# Patient Record
Sex: MALE | Race: WHITE | NOT HISPANIC OR LATINO | Employment: FULL TIME | URBAN - NONMETROPOLITAN AREA
[De-identification: names, ages, dates, MRNs, and addresses within clinical notes are randomized per-mention and may not be internally consistent; named-entity substitution may affect disease eponyms.]

---

## 2021-01-08 ENCOUNTER — HOSPITAL ENCOUNTER (EMERGENCY)
Facility: HOSPITAL | Age: 46
Discharge: HOME | End: 2021-01-08
Attending: EMERGENCY MEDICINE | Admitting: EMERGENCY MEDICINE

## 2021-01-08 ENCOUNTER — APPOINTMENT (OUTPATIENT)
Dept: GENERAL RADIOLOGY | Facility: HOSPITAL | Age: 46
End: 2021-01-08

## 2021-01-08 VITALS
SYSTOLIC BLOOD PRESSURE: 145 MMHG | WEIGHT: 285 LBS | BODY MASS INDEX: 35.43 KG/M2 | HEIGHT: 75 IN | DIASTOLIC BLOOD PRESSURE: 84 MMHG | HEART RATE: 89 BPM | TEMPERATURE: 99.7 F | OXYGEN SATURATION: 94 % | RESPIRATION RATE: 17 BRPM

## 2021-01-08 DIAGNOSIS — U07.1 COVID-19: Primary | ICD-10-CM

## 2021-01-08 LAB
ALBUMIN SERPL-MCNC: 4.1 G/DL (ref 3.5–5.2)
ALBUMIN/GLOB SERPL: 1.2 G/DL
ALP SERPL-CCNC: 450 U/L (ref 39–117)
ALT SERPL W P-5'-P-CCNC: 376 U/L (ref 1–41)
ANION GAP SERPL CALCULATED.3IONS-SCNC: 14.2 MMOL/L (ref 5–15)
ANISOCYTOSIS BLD QL: NORMAL
AST SERPL-CCNC: 206 U/L (ref 1–40)
BASOPHILS # BLD AUTO: 0.02 10*3/MM3 (ref 0–0.2)
BASOPHILS NFR BLD AUTO: 0.3 % (ref 0–1.5)
BILIRUB SERPL-MCNC: 0.5 MG/DL (ref 0–1.2)
BUN SERPL-MCNC: 19 MG/DL (ref 6–20)
BUN/CREAT SERPL: 14.2 (ref 7–25)
CALCIUM SPEC-SCNC: 8.4 MG/DL (ref 8.6–10.5)
CHLORIDE SERPL-SCNC: 100 MMOL/L (ref 98–107)
CO2 SERPL-SCNC: 21.8 MMOL/L (ref 22–29)
CREAT SERPL-MCNC: 1.34 MG/DL (ref 0.76–1.27)
DEPRECATED RDW RBC AUTO: 37.8 FL (ref 37–54)
ELLIPTOCYTES BLD QL SMEAR: NORMAL
EOSINOPHIL # BLD AUTO: 0.02 10*3/MM3 (ref 0–0.4)
EOSINOPHIL NFR BLD AUTO: 0.3 % (ref 0.3–6.2)
ERYTHROCYTE [DISTWIDTH] IN BLOOD BY AUTOMATED COUNT: 14.5 % (ref 12.3–15.4)
GFR SERPL CREATININE-BSD FRML MDRD: 57 ML/MIN/1.73
GLOBULIN UR ELPH-MCNC: 3.4 GM/DL
GLUCOSE SERPL-MCNC: 122 MG/DL (ref 65–99)
HCT VFR BLD AUTO: 43.4 % (ref 37.5–51)
HGB BLD-MCNC: 13.5 G/DL (ref 13–17.7)
IMM GRANULOCYTES # BLD AUTO: 0.04 10*3/MM3 (ref 0–0.05)
IMM GRANULOCYTES NFR BLD AUTO: 0.6 % (ref 0–0.5)
LARGE PLATELETS: NORMAL
LYMPHOCYTES # BLD AUTO: 0.6 10*3/MM3 (ref 0.7–3.1)
LYMPHOCYTES NFR BLD AUTO: 9.6 % (ref 19.6–45.3)
MCH RBC QN AUTO: 22.7 PG (ref 26.6–33)
MCHC RBC AUTO-ENTMCNC: 31.1 G/DL (ref 31.5–35.7)
MCV RBC AUTO: 73.1 FL (ref 79–97)
MICROCYTES BLD QL: NORMAL
MONOCYTES # BLD AUTO: 0.28 10*3/MM3 (ref 0.1–0.9)
MONOCYTES NFR BLD AUTO: 4.5 % (ref 5–12)
NEUTROPHILS NFR BLD AUTO: 5.27 10*3/MM3 (ref 1.7–7)
NEUTROPHILS NFR BLD AUTO: 84.7 % (ref 42.7–76)
NRBC BLD AUTO-RTO: 0 /100 WBC (ref 0–0.2)
PLATELET # BLD AUTO: 214 10*3/MM3 (ref 140–450)
PMV BLD AUTO: 10.3 FL (ref 6–12)
POIKILOCYTOSIS BLD QL SMEAR: NORMAL
POTASSIUM SERPL-SCNC: 4.5 MMOL/L (ref 3.5–5.2)
PROT SERPL-MCNC: 7.5 G/DL (ref 6–8.5)
RBC # BLD AUTO: 5.94 10*6/MM3 (ref 4.14–5.8)
SMALL PLATELETS BLD QL SMEAR: ADEQUATE
SODIUM SERPL-SCNC: 136 MMOL/L (ref 136–145)
WBC # BLD AUTO: 6.23 10*3/MM3 (ref 3.4–10.8)
WBC MORPH BLD: NORMAL

## 2021-01-08 PROCEDURE — 99283 EMERGENCY DEPT VISIT LOW MDM: CPT

## 2021-01-08 PROCEDURE — 85025 COMPLETE CBC W/AUTO DIFF WBC: CPT | Performed by: EMERGENCY MEDICINE

## 2021-01-08 PROCEDURE — 80053 COMPREHEN METABOLIC PANEL: CPT | Performed by: EMERGENCY MEDICINE

## 2021-01-08 PROCEDURE — 85007 BL SMEAR W/DIFF WBC COUNT: CPT | Performed by: EMERGENCY MEDICINE

## 2021-01-08 PROCEDURE — 71045 X-RAY EXAM CHEST 1 VIEW: CPT

## 2021-01-08 RX ORDER — IBUPROFEN 800 MG/1
800 TABLET ORAL ONCE
Status: COMPLETED | OUTPATIENT
Start: 2021-01-08 | End: 2021-01-08

## 2021-01-08 RX ORDER — ACETAMINOPHEN 325 MG/1
975 TABLET ORAL ONCE
Status: COMPLETED | OUTPATIENT
Start: 2021-01-08 | End: 2021-01-08

## 2021-01-08 RX ADMIN — ACETAMINOPHEN 975 MG: 325 TABLET, FILM COATED ORAL at 13:16

## 2021-01-08 RX ADMIN — IBUPROFEN 800 MG: 800 TABLET, FILM COATED ORAL at 12:41

## 2021-01-08 RX ADMIN — SODIUM CHLORIDE 1000 ML: 9 INJECTION, SOLUTION INTRAVENOUS at 12:40

## 2021-01-08 NOTE — ED PROVIDER NOTES
Subjective   45-year-old male presents to the ED with a chief complaint of fever.  Patient states that he tested positive for COVID-19 approximately 10 days ago.  He has been doing well at home but he states that he has been unable to control his fever for the last few days.  He states he has been taking Tylenol intermittently without any improvement in his symptoms.  He complains of feeling flushed and warm.  He denies chest pain or shortness of breath.  Does note that he has a cough but that is not productive of sputum and is not causing him much symptomatology.  He denies lightheadedness or dizziness.  No nausea vomiting diarrhea or abdominal pain.  No other complaints at this time.          Review of Systems   Constitutional: Positive for fever.   Respiratory: Positive for cough. Negative for chest tightness, shortness of breath and wheezing.    All other systems reviewed and are negative.      History reviewed. No pertinent past medical history.    No Known Allergies    Past Surgical History:   Procedure Laterality Date   • KNEE SURGERY         History reviewed. No pertinent family history.    Social History     Socioeconomic History   • Marital status:      Spouse name: Not on file   • Number of children: Not on file   • Years of education: Not on file   • Highest education level: Not on file   Tobacco Use   • Smoking status: Never Smoker   Substance and Sexual Activity   • Alcohol use: Yes     Comment: occassionally   • Drug use: Never   • Sexual activity: Defer           Objective   Physical Exam  Vitals signs and nursing note reviewed.   Constitutional:       General: He is not in acute distress.     Appearance: He is well-developed. He is not diaphoretic.   HENT:      Head: Normocephalic and atraumatic.      Nose: Nose normal.   Eyes:      Conjunctiva/sclera: Conjunctivae normal.      Pupils: Pupils are equal, round, and reactive to light.   Cardiovascular:      Rate and Rhythm: Normal rate and  regular rhythm.   Pulmonary:      Effort: Pulmonary effort is normal. No respiratory distress.      Breath sounds: Normal breath sounds.   Abdominal:      General: There is no distension.      Palpations: Abdomen is soft.      Tenderness: There is no abdominal tenderness.   Musculoskeletal:         General: No deformity.   Neurological:      Mental Status: He is alert and oriented to person, place, and time.      Cranial Nerves: No cranial nerve deficit.      Coordination: Coordination normal.         Procedures           ED Course      PULSE OXIMETRY INTERPRETATION  Patient had a pulse ox of 94% on room air. This is a normal pulse oximetry reading.                                     MDM  45-year-old male known Covid positive presents to the ED for fever. Known Covid. Chest x-ray confirms Covid pneumonia. His pulse ox 94% on room air with normal respiratory effort. Heart rate improved after antipyretics and fluids. He is resting comfortably. No chest pain or shortness of breath. I feel that he is appropriate for discharge with continuation of antipyretics he is agreeable to this plan.      Final diagnoses:   COVID-19            Lambert Quevedo, DO  01/08/21 1775

## 2021-01-11 ENCOUNTER — APPOINTMENT (OUTPATIENT)
Dept: GENERAL RADIOLOGY | Facility: HOSPITAL | Age: 46
End: 2021-01-11

## 2021-01-11 ENCOUNTER — HOSPITAL ENCOUNTER (INPATIENT)
Facility: HOSPITAL | Age: 46
LOS: 3 days | Discharge: HOME | End: 2021-01-14
Attending: EMERGENCY MEDICINE | Admitting: INTERNAL MEDICINE

## 2021-01-11 DIAGNOSIS — E66.9 CLASS 2 OBESITY WITHOUT SERIOUS COMORBIDITY WITH BODY MASS INDEX (BMI) OF 35.0 TO 35.9 IN ADULT, UNSPECIFIED OBESITY TYPE: ICD-10-CM

## 2021-01-11 DIAGNOSIS — J12.82 PNEUMONIA DUE TO COVID-19 VIRUS: Primary | ICD-10-CM

## 2021-01-11 DIAGNOSIS — U07.1 PNEUMONIA DUE TO COVID-19 VIRUS: Primary | ICD-10-CM

## 2021-01-11 DIAGNOSIS — J96.01 ACUTE RESPIRATORY FAILURE WITH HYPOXIA (HCC): ICD-10-CM

## 2021-01-11 DIAGNOSIS — N28.9 MILD RENAL INSUFFICIENCY: ICD-10-CM

## 2021-01-11 DIAGNOSIS — R74.01 TRANSAMINITIS: ICD-10-CM

## 2021-01-11 LAB
A-A DO2: 37.3 MMHG
ALBUMIN SERPL-MCNC: 3.6 G/DL (ref 3.5–5.2)
ALBUMIN/GLOB SERPL: 0.9 G/DL
ALP SERPL-CCNC: 546 U/L (ref 39–117)
ALT SERPL W P-5'-P-CCNC: 352 U/L (ref 1–41)
ANION GAP SERPL CALCULATED.3IONS-SCNC: 10.4 MMOL/L (ref 5–15)
ARTERIAL PATENCY WRIST A: POSITIVE
AST SERPL-CCNC: 182 U/L (ref 1–40)
ATMOSPHERIC PRESS: 742 MMHG
BASE EXCESS BLDA CALC-SCNC: 1.4 MMOL/L (ref 0–2)
BASOPHILS # BLD AUTO: 0.02 10*3/MM3 (ref 0–0.2)
BASOPHILS NFR BLD AUTO: 0.4 % (ref 0–1.5)
BDY SITE: ABNORMAL
BILIRUB SERPL-MCNC: 0.8 MG/DL (ref 0–1.2)
BUN SERPL-MCNC: 14 MG/DL (ref 6–20)
BUN/CREAT SERPL: 9.8 (ref 7–25)
CALCIUM SPEC-SCNC: 8.5 MG/DL (ref 8.6–10.5)
CHLORIDE SERPL-SCNC: 100 MMOL/L (ref 98–107)
CO2 SERPL-SCNC: 24.6 MMOL/L (ref 22–29)
COHGB MFR BLD: 0.8 % (ref 0–2)
CREAT SERPL-MCNC: 1.43 MG/DL (ref 0.76–1.27)
D DIMER PPP FEU-MCNC: 1.23 MCGFEU/ML (ref 0–0.57)
DEPRECATED RDW RBC AUTO: 38.3 FL (ref 37–54)
EOSINOPHIL # BLD AUTO: 0 10*3/MM3 (ref 0–0.4)
EOSINOPHIL NFR BLD AUTO: 0 % (ref 0.3–6.2)
ERYTHROCYTE [DISTWIDTH] IN BLOOD BY AUTOMATED COUNT: 14.6 % (ref 12.3–15.4)
FERRITIN SERPL-MCNC: 3377 NG/ML (ref 30–400)
GAS FLOW AIRWAY: 3 LPM
GFR SERPL CREATININE-BSD FRML MDRD: 53 ML/MIN/1.73
GLOBULIN UR ELPH-MCNC: 4.2 GM/DL
GLUCOSE SERPL-MCNC: 129 MG/DL (ref 65–99)
HCO3 BLDA-SCNC: 25.6 MMOL/L (ref 22–28)
HCT VFR BLD AUTO: 43.5 % (ref 37.5–51)
HCT VFR BLD CALC: 40 %
HGB BLD-MCNC: 13.5 G/DL (ref 13–17.7)
HOLD SPECIMEN: NORMAL
HOLD SPECIMEN: NORMAL
IMM GRANULOCYTES # BLD AUTO: 0.04 10*3/MM3 (ref 0–0.05)
IMM GRANULOCYTES NFR BLD AUTO: 0.7 % (ref 0–0.5)
LDH SERPL-CCNC: 626 U/L (ref 135–225)
LYMPHOCYTES # BLD AUTO: 0.91 10*3/MM3 (ref 0.7–3.1)
LYMPHOCYTES NFR BLD AUTO: 16.3 % (ref 19.6–45.3)
MCH RBC QN AUTO: 22.6 PG (ref 26.6–33)
MCHC RBC AUTO-ENTMCNC: 31 G/DL (ref 31.5–35.7)
MCV RBC AUTO: 72.9 FL (ref 79–97)
METHGB BLD QL: 0.9 % (ref 0–1.5)
MODALITY: ABNORMAL
MONOCYTES # BLD AUTO: 0.11 10*3/MM3 (ref 0.1–0.9)
MONOCYTES NFR BLD AUTO: 2 % (ref 5–12)
NEUTROPHILS NFR BLD AUTO: 4.49 10*3/MM3 (ref 1.7–7)
NEUTROPHILS NFR BLD AUTO: 80.6 % (ref 42.7–76)
NOTE: ABNORMAL
NRBC BLD AUTO-RTO: 0 /100 WBC (ref 0–0.2)
NT-PROBNP SERPL-MCNC: 79.5 PG/ML (ref 0–450)
OXYHGB MFR BLDV: 91.7 % (ref 94–99)
PCO2 BLDA: 38.1 MM HG (ref 35–45)
PCO2 TEMP ADJ BLD: ABNORMAL MM[HG]
PH BLDA: 7.44 PH UNITS (ref 7.3–7.5)
PH, TEMP CORRECTED: ABNORMAL
PLATELET # BLD AUTO: 284 10*3/MM3 (ref 140–450)
PMV BLD AUTO: 9.6 FL (ref 6–12)
PO2 BLDA: 65.6 MM HG (ref 75–100)
PO2 TEMP ADJ BLD: ABNORMAL MM[HG]
POTASSIUM SERPL-SCNC: 4.5 MMOL/L (ref 3.5–5.2)
PROCALCITONIN SERPL-MCNC: 0.52 NG/ML (ref 0–0.25)
PROT SERPL-MCNC: 7.8 G/DL (ref 6–8.5)
RBC # BLD AUTO: 5.97 10*6/MM3 (ref 4.14–5.8)
RBC MORPH BLD: NORMAL
SAO2 % BLDCOA: 93.3 % (ref 94–100)
SARS-COV-2 RNA PNL SPEC NAA+PROBE: DETECTED
SMALL PLATELETS BLD QL SMEAR: ADEQUATE
SODIUM SERPL-SCNC: 135 MMOL/L (ref 136–145)
TROPONIN T SERPL-MCNC: <0.01 NG/ML (ref 0–0.03)
VENTILATOR MODE: ABNORMAL
WBC # BLD AUTO: 5.57 10*3/MM3 (ref 3.4–10.8)
WBC MORPH BLD: NORMAL
WHOLE BLOOD HOLD SPECIMEN: NORMAL
WHOLE BLOOD HOLD SPECIMEN: NORMAL

## 2021-01-11 PROCEDURE — 25010000002 CEFTRIAXONE SODIUM-DEXTROSE 1-3.74 GM-%(50ML) RECONSTITUTED SOLUTION: Performed by: EMERGENCY MEDICINE

## 2021-01-11 PROCEDURE — 83880 ASSAY OF NATRIURETIC PEPTIDE: CPT | Performed by: EMERGENCY MEDICINE

## 2021-01-11 PROCEDURE — 99285 EMERGENCY DEPT VISIT HI MDM: CPT

## 2021-01-11 PROCEDURE — 25010000002 ENOXAPARIN PER 10 MG: Performed by: NURSE PRACTITIONER

## 2021-01-11 PROCEDURE — 25010000002 DEXAMETHASONE SODIUM PHOSPHATE 10 MG/ML SOLUTION: Performed by: EMERGENCY MEDICINE

## 2021-01-11 PROCEDURE — 87040 BLOOD CULTURE FOR BACTERIA: CPT | Performed by: EMERGENCY MEDICINE

## 2021-01-11 PROCEDURE — 83050 HGB METHEMOGLOBIN QUAN: CPT

## 2021-01-11 PROCEDURE — 87635 SARS-COV-2 COVID-19 AMP PRB: CPT | Performed by: INTERNAL MEDICINE

## 2021-01-11 PROCEDURE — 99284 EMERGENCY DEPT VISIT MOD MDM: CPT

## 2021-01-11 PROCEDURE — 84145 PROCALCITONIN (PCT): CPT | Performed by: NURSE PRACTITIONER

## 2021-01-11 PROCEDURE — 82728 ASSAY OF FERRITIN: CPT | Performed by: NURSE PRACTITIONER

## 2021-01-11 PROCEDURE — 36600 WITHDRAWAL OF ARTERIAL BLOOD: CPT

## 2021-01-11 PROCEDURE — 85007 BL SMEAR W/DIFF WBC COUNT: CPT | Performed by: EMERGENCY MEDICINE

## 2021-01-11 PROCEDURE — 85379 FIBRIN DEGRADATION QUANT: CPT | Performed by: NURSE PRACTITIONER

## 2021-01-11 PROCEDURE — 83615 LACTATE (LD) (LDH) ENZYME: CPT | Performed by: NURSE PRACTITIONER

## 2021-01-11 PROCEDURE — 71045 X-RAY EXAM CHEST 1 VIEW: CPT

## 2021-01-11 PROCEDURE — 25010000002 AZITHROMYCIN 500 MG/250 ML: Performed by: EMERGENCY MEDICINE

## 2021-01-11 PROCEDURE — 93005 ELECTROCARDIOGRAM TRACING: CPT | Performed by: EMERGENCY MEDICINE

## 2021-01-11 PROCEDURE — 82805 BLOOD GASES W/O2 SATURATION: CPT

## 2021-01-11 PROCEDURE — 82375 ASSAY CARBOXYHB QUANT: CPT

## 2021-01-11 PROCEDURE — 80053 COMPREHEN METABOLIC PANEL: CPT | Performed by: EMERGENCY MEDICINE

## 2021-01-11 PROCEDURE — 85025 COMPLETE CBC W/AUTO DIFF WBC: CPT | Performed by: EMERGENCY MEDICINE

## 2021-01-11 PROCEDURE — 99222 1ST HOSP IP/OBS MODERATE 55: CPT | Performed by: NURSE PRACTITIONER

## 2021-01-11 PROCEDURE — 84484 ASSAY OF TROPONIN QUANT: CPT | Performed by: EMERGENCY MEDICINE

## 2021-01-11 PROCEDURE — XW033E5 INTRODUCTION OF REMDESIVIR ANTI-INFECTIVE INTO PERIPHERAL VEIN, PERCUTANEOUS APPROACH, NEW TECHNOLOGY GROUP 5: ICD-10-PCS | Performed by: INTERNAL MEDICINE

## 2021-01-11 RX ORDER — CEFTRIAXONE 1 G/50ML
1 INJECTION, SOLUTION INTRAVENOUS EVERY 24 HOURS
Status: COMPLETED | OUTPATIENT
Start: 2021-01-12 | End: 2021-01-13

## 2021-01-11 RX ORDER — DEXAMETHASONE SODIUM PHOSPHATE 10 MG/ML
6 INJECTION, SOLUTION INTRAMUSCULAR; INTRAVENOUS ONCE
Status: COMPLETED | OUTPATIENT
Start: 2021-01-11 | End: 2021-01-11

## 2021-01-11 RX ORDER — BENZONATATE 100 MG/1
100 CAPSULE ORAL 3 TIMES DAILY PRN
Status: DISCONTINUED | OUTPATIENT
Start: 2021-01-11 | End: 2021-01-14 | Stop reason: HOSPADM

## 2021-01-11 RX ORDER — ONDANSETRON 4 MG/1
4 TABLET, FILM COATED ORAL EVERY 6 HOURS PRN
Status: DISCONTINUED | OUTPATIENT
Start: 2021-01-11 | End: 2021-01-14 | Stop reason: HOSPADM

## 2021-01-11 RX ORDER — ALBUTEROL SULFATE 90 UG/1
2 AEROSOL, METERED RESPIRATORY (INHALATION) EVERY 6 HOURS PRN
Status: DISCONTINUED | OUTPATIENT
Start: 2021-01-11 | End: 2021-01-14 | Stop reason: HOSPADM

## 2021-01-11 RX ORDER — SODIUM CHLORIDE 0.9 % (FLUSH) 0.9 %
10 SYRINGE (ML) INJECTION AS NEEDED
Status: DISCONTINUED | OUTPATIENT
Start: 2021-01-11 | End: 2021-01-14 | Stop reason: HOSPADM

## 2021-01-11 RX ORDER — DEXAMETHASONE SODIUM PHOSPHATE 4 MG/ML
6 INJECTION, SOLUTION INTRA-ARTICULAR; INTRALESIONAL; INTRAMUSCULAR; INTRAVENOUS; SOFT TISSUE
Status: DISCONTINUED | OUTPATIENT
Start: 2021-01-12 | End: 2021-01-13

## 2021-01-11 RX ORDER — ONDANSETRON 2 MG/ML
4 INJECTION INTRAMUSCULAR; INTRAVENOUS EVERY 6 HOURS PRN
Status: DISCONTINUED | OUTPATIENT
Start: 2021-01-11 | End: 2021-01-14 | Stop reason: HOSPADM

## 2021-01-11 RX ORDER — CHOLECALCIFEROL (VITAMIN D3) 125 MCG
5 CAPSULE ORAL NIGHTLY PRN
Status: DISCONTINUED | OUTPATIENT
Start: 2021-01-11 | End: 2021-01-14 | Stop reason: HOSPADM

## 2021-01-11 RX ORDER — CEFTRIAXONE 1 G/50ML
1 INJECTION, SOLUTION INTRAVENOUS ONCE
Status: COMPLETED | OUTPATIENT
Start: 2021-01-11 | End: 2021-01-11

## 2021-01-11 RX ORDER — SODIUM CHLORIDE 9 MG/ML
75 INJECTION, SOLUTION INTRAVENOUS CONTINUOUS
Status: DISCONTINUED | OUTPATIENT
Start: 2021-01-11 | End: 2021-01-12

## 2021-01-11 RX ORDER — ACETAMINOPHEN 325 MG/1
650 TABLET ORAL ONCE
Status: COMPLETED | OUTPATIENT
Start: 2021-01-11 | End: 2021-01-11

## 2021-01-11 RX ORDER — ACETAMINOPHEN 325 MG/1
650 TABLET ORAL EVERY 4 HOURS PRN
Status: DISCONTINUED | OUTPATIENT
Start: 2021-01-11 | End: 2021-01-14 | Stop reason: HOSPADM

## 2021-01-11 RX ORDER — SODIUM CHLORIDE 0.9 % (FLUSH) 0.9 %
10 SYRINGE (ML) INJECTION EVERY 12 HOURS SCHEDULED
Status: DISCONTINUED | OUTPATIENT
Start: 2021-01-11 | End: 2021-01-14 | Stop reason: HOSPADM

## 2021-01-11 RX ADMIN — AZITHROMYCIN 500 MG: 500 INJECTION, POWDER, LYOPHILIZED, FOR SOLUTION INTRAVENOUS at 11:38

## 2021-01-11 RX ADMIN — BENZONATATE 100 MG: 100 CAPSULE ORAL at 23:29

## 2021-01-11 RX ADMIN — SODIUM CHLORIDE, PRESERVATIVE FREE 10 ML: 5 INJECTION INTRAVENOUS at 16:03

## 2021-01-11 RX ADMIN — ACETAMINOPHEN 650 MG: 325 TABLET, FILM COATED ORAL at 11:35

## 2021-01-11 RX ADMIN — ACETAMINOPHEN 650 MG: 325 TABLET, FILM COATED ORAL at 22:05

## 2021-01-11 RX ADMIN — ALBUTEROL SULFATE 2 PUFF: 90 AEROSOL, METERED RESPIRATORY (INHALATION) at 16:02

## 2021-01-11 RX ADMIN — REMDESIVIR 200 MG: 100 INJECTION, POWDER, LYOPHILIZED, FOR SOLUTION INTRAVENOUS at 16:02

## 2021-01-11 RX ADMIN — DEXAMETHASONE SODIUM PHOSPHATE 6 MG: 10 INJECTION, SOLUTION INTRAMUSCULAR; INTRAVENOUS at 10:34

## 2021-01-11 RX ADMIN — ENOXAPARIN SODIUM 60 MG: 60 INJECTION SUBCUTANEOUS at 16:02

## 2021-01-11 RX ADMIN — CEFTRIAXONE 1 G: 1 INJECTION, SOLUTION INTRAVENOUS at 11:12

## 2021-01-11 RX ADMIN — Medication 5 MG: at 23:29

## 2021-01-11 RX ADMIN — SODIUM CHLORIDE 75 ML/HR: 9 INJECTION, SOLUTION INTRAVENOUS at 16:04

## 2021-01-11 NOTE — PLAN OF CARE
Goal Outcome Evaluation:  Plan of Care Reviewed With: patient  Progress: no change   New admit for PNU d/t Covid. SOA on 3L NC at home and resting SpO2 of 75% on 3L NC reported before he came to hospital. Denies pain. Temp ontrolled per MAR. Regular diet. Will continue to monitor.

## 2021-01-11 NOTE — ED PROVIDER NOTES
Subjective   History of Present Illness    Chief Complaint: Shortness of breath cough  History of Present Illness: 45-year-old male, Covid +6 days ago, have been having symptoms for the last 10 to 14 days, worsening shortness of breath, and associated hypoxia at home today, states he was monitoring his sats with pulse ox and they were at 87% at home.  Was seen and evaluated in ER 3 days ago had chest x-ray confirming Covid pneumonia.  Room air sats were 94% at that time.  Presents with worsening symptoms.  Onset: Ongoing issue, see above narrative  Duration: Persistent worsening  Exacerbating / Alleviating factors: Use of over-the-counter medications without relief  Associated symptoms: None      Nurses Notes reviewed and agree, including vitals, allergies, social history and prior medical history.     REVIEW OF SYSTEMS: All systems reviewed and not pertinent unless noted.    Positive for: Shortness of breath cough    Negative for: Fever hemoptysis chest pain palpitations syncope abdominal pain diarrhea flank pain urinary symptoms rash  Review of Systems    History reviewed. No pertinent past medical history.    No Known Allergies    Past Surgical History:   Procedure Laterality Date   • KNEE SURGERY         History reviewed. No pertinent family history.    Social History     Socioeconomic History   • Marital status:      Spouse name: Not on file   • Number of children: Not on file   • Years of education: Not on file   • Highest education level: Not on file   Tobacco Use   • Smoking status: Never Smoker   Substance and Sexual Activity   • Alcohol use: Yes     Comment: occassionally   • Drug use: Never   • Sexual activity: Defer           Objective   Physical Exam    GENERAL APPEARANCE: Well developed, obese 45-year-old black male,  in no acute distress.  VITAL SIGNS: per nursing, reviewed and noted  SKIN: exposed skin with no rashes, ulcerations or petechiae.  Head: Normocephalic, atraumatic.   EYES: perrla.  EOMI.  ENT: Normal voice.  Patient maintained wearing a mask throughout patient encounter due to coronavirus pandemic  LUNGS:  No increased work of breathing. No retractions.  Decreased breath sounds throughout  CARDIOVASCULAR:  regular rate and rhythm, no murmurs.  Good Peripheral pulses. Good cap refill to extremities.   ABDOMEN: Soft, nontender, normal bowel sounds. No hernia. No ascites.  MUSCULOSKELETAL:  No tenderness. Full ROM. Strength and tone normal.  NEUROLOGIC: Alert, oriented x 3. No gross deficits. GCS 15.   NECK: Supple, symmetric. No tenderness, no masses. Full ROM  Back: full rom, no paraspinal spasm. No CVA tenderness.   PSYCH: appropriate affect.  : no bladder tenderness or distention, no CVA tenderness      Procedures     No attending physician procedures were performed on this patient.      ED Course  ED Course as of Jan 11 1100 Mon Jan 11, 2021   1036 EKG interpreted by me reveals sinus rhythm rate 95.  Nonspecific diffuse T wave changes.  Diffuse T wave inversions    [PF]   1044 pH, Arterial: 7.436 [PF]   1044 pCO2, Arterial: 38.1 [PF]   1044 pO2, Arterial(!): 65.6 [PF]   1044 HCO3, Arterial: 25.6 [PF]   1044 Base Excess: 1.4 [PF]   1044 O2 Saturation, Arterial(!): 93.3 [PF]   1044 Hematocrit, Blood Gas: 40.0 [PF]   1044 Oxyhemoglobin(!): 91.7 [PF]   1044 Methemoglobin: 0.90 [PF]   1044 Carboxyhemoglobin: 0.8 [PF]   1044 WBC: 5.57 [PF]   1044 Hemoglobin: 13.5 [PF]   1044 Hematocrit: 43.5 [PF]   1044 Platelets: 284 [PF]   1044 Troponin T: <0.010 [PF]   1044 proBNP: 79.5 [PF]   1044 Glucose(!): 129 [PF]   1044 BUN: 14 [PF]   1044 Creatinine(!): 1.43 [PF]   1044 Sodium(!): 135 [PF]   1044 Potassium: 4.5 [PF]   1044 Chloride: 100 [PF]   1044 CO2: 24.6 [PF]   1044 Calcium(!): 8.5 [PF]   1044 Total Protein: 7.8 [PF]   1044 Albumin: 3.60 [PF]   1044 ALT (SGPT)(!): 352 [PF]   1044 AST (SGOT)(!): 182 [PF]   1044 Total Bilirubin: 0.8 [PF]   1044 Alkaline Phosphatase(!): 546 [PF]      ED Course User  Index  [PF] Jake Cespedes W, DO         Chest x-ray single view interpreted by me reveals worsening bilateral patchy infiltrates compared to 3 days prior.  Negative troponin.  Mild renal insufficiency with creatinine 1.43, compared to 3 days prior was 1.34.  Stable transaminitis compared to 3 days prior with   alk phosphatase 546 normal bilirubin.     45-year-old male presents with worsening shortness of breath now associated with hypoxia with room air sats 87% at rest here.  Patient is requiring 3 L nasal cannula oxygen support, with improvement of sats up to 92%.  Patient otherwise normotensive, no tachycardia. patient was dosed with Decadron, after discussion with hospitalist added Rocephin Zithromax, patient will need admission.  Dr. Packer will admit, patient agreeable with plan.                                    MDM  Number of Diagnoses or Management Options     Amount and/or Complexity of Data Reviewed  Clinical lab tests: reviewed and ordered  Tests in the radiology section of CPT®: ordered and reviewed  Review and summarize past medical records: yes  Discuss the patient with other providers: yes  Independent visualization of images, tracings, or specimens: yes    Risk of Complications, Morbidity, and/or Mortality  Presenting problems: moderate  Diagnostic procedures: moderate  Management options: moderate  General comments: 35 minutes critical care time for assessment and management of a 45-year-old male with respiratory failure with hypoxia with worsening Covid pneumonia.  Time includes assessment, examination, charting, ordering and interpretation of labs and diagnostic studies, interpretation of EKG, pulse ox interpretation, oxygen management, chest x-ray, formulation of care plan    Critical Care  Total time providing critical care: 30-74 minutes    Patient Progress  Patient progress: improved      Final diagnoses:   Pneumonia due to COVID-19 virus   Acute respiratory failure with hypoxia  (CMS/HCC)   Transaminitis   Mild renal insufficiency   Class 2 obesity without serious comorbidity with body mass index (BMI) of 35.0 to 35.9 in adult, unspecified obesity type            Jake Cespedes DO  01/11/21 1100

## 2021-01-11 NOTE — ED NOTES
Ernestine, house supervisor stated there is no beds at this time and no orders for discharges. She stated she will contact us with a bed assignment a soon as possible.     Soila Rosado  01/11/21 9231

## 2021-01-11 NOTE — ED NOTES
Attempted to call RN back to give report, RN is w/ another pt and will call back for report.      Erlinda Mitchell, RN  01/11/21 7694

## 2021-01-11 NOTE — H&P
[facility]      Name:  Kraig Stafford   Age:  45 y.o.  Sex:  male  :  1975  MRN:  [id number]   Visit Number:  [id number]  Admission Date:  2021  Date Of Service:  21  Primary Care Physician:  Provider, No Known    Chief Complaint:     Shortness of Breath    History Of Presenting Illness:      Patient is 45 year old  male that presented to the Emergency Department today after being diagnosed with Covid 19 6 days ago with worsening symptoms.  Patient complained of worsening shortness of breath and states that his pulse ox at home was reading in the mid 70s with ambulation.  Room air saturations on arrival were 87%.  Oxygen at 3L improved to 92%.  Patient with no significant health history and states he does not really go to the doctor.  He does not have a PCP.  He is  with 2 grown children.  He says his wife has been taking care of him from a distance.      Workup in the ED revealed CXR with worsening BL infiltrates compared to 3 days ago.  Renal insufficiency noted with Creatinine-1.43 compared to 1.34 on prior visit 3 days ago.  Transaminitis stable compared to 3 days prior with ALK Phos-546, AST-182, and ALT-352.  Patient received Decadron and Rocephin/ Azithromycin while in the ED.  ABG stable in the ED.  Vital signs stable on admission.  Will admit to the hospitalist service for further treatment.    Review Of Systems:     General ROS: Patient denies any fevers, chills or loss of consciousness.  +generalized weakness/ malaise  Psychological ROS: No history of any hallucinations and delusions.  Ophthalmic ROS: No history of any diplopia or transient loss of vision.  ENT ROS: No history of sore throat, nasal congestion or ear pain.   Allergy and Immunology ROS: No history of rash or itching.  Hematological and Lymphatic ROS: No history of neck swelling or easy bleeding.  Endocrine ROS: No history of any recent  unintentional weight gain or loss.  Respiratory ROS: + productive cough with blood per patient report and shortness of breath.   Cardiovascular ROS: No chest pain or palpitations.   Gastrointestinal ROS: No history of nausea and vomiting. Denies any abdominal pain. No diarrhea.   GenitoUrinary ROS: No history of dysuria or hematuria.  Musculoskeletal ROS: No muscle pain. No calf pain.  Neurological ROS: No history of any focal weakness. No loss of consciousness. Denies any numbness.  Dermatological ROS: No history of any redness or pruritis.     Past Medical History:    History reviewed. No pertinent past medical history.    Past Surgical history:    Past Surgical History:   Procedure Laterality Date   • KNEE SURGERY         Social History:    Social History     Socioeconomic History   • Marital status:      Spouse name: Not on file   • Number of children: Not on file   • Years of education: Not on file   • Highest education level: Not on file   Tobacco Use   • Smoking status: Never Smoker   Substance and Sexual Activity   • Alcohol use: Yes     Comment: occassionally   • Drug use: Never   • Sexual activity: Defer       Family History:  Reviewed    History reviewed. No pertinent family history.    Allergies:  Reviewed    Patient has no known allergies.    Home Medications:    Prior to Admission Medications     None             ED Medications:  Reviewed    Medications   sodium chloride 0.9 % flush 10 mL (has no administration in time range)   AZITHROMYCIN 500 MG/250 ML 0.9% NS IVPB (vial-mate) (500 mg Intravenous New Bag 1/11/21 1138)   dexamethasone sodium phosphate injection 6 mg (6 mg Intravenous Given 1/11/21 1034)   cefTRIAXone (ROCEPHIN) IVPB 1 g/50ml dextrose (premix) (0 g Intravenous Stopped 1/11/21 1138)   acetaminophen (TYLENOL) tablet 650 mg (650 mg Oral Given 1/11/21 1135)       Vital Signs:    Temp:  [99.9 °F (37.7 °C)] 99.9 °F (37.7 °C)  Heart Rate:  [88-98] 90  Resp:  [18-20] 20  BP:  (130-147)/(77-92) 130/77        01/11/21  0946   Weight: 129 kg (285 lb)       Body mass index is 35.62 kg/m².    Physical Exam:    General Appearance:  Alert and cooperative, not in any acute distress, resting in ED stretcher during examination   Head:  Atraumatic and normocephalic, without obvious abnormality.   Eyes:          Conjunctivae and sclerae normal, no Icterus. No pallor. Extraocular movements are within normal limits.   Ears:  Ears appear intact with no abnormalities noted.   Throat: No oral lesions, no thrush, oral mucosa moist.   Neck: Supple, trachea midline   Back:   No kyphoscoliosis present. No tenderness to palpation,   range of motion normal.   Lungs:   Chest shape is normal. Breath sounds heard bilaterally equally but diminished. BL crackles to bases. No Pleural rub or bronchial breathing.   Heart:  RRR, no murmur, no gallop, no rub. No JVD.   Abdomen:   Normal bowel sounds, no masses, no organomegaly. Soft, nontender, nondistended, no guarding, no rebound tenderness.   Extremities: Moves all extremities, no edema, no cyanosis, no clubbing.   Pulses: Pulses palpable and equal bilaterally.   Skin: No bleeding, bruising or rash.   Neurologic: Alert and oriented x 3. Moves all four limbs equally. No tremors. No facial asymmetry.     Laboratory data:    I have reviewed the labs done in the emergency room.    Results from last 7 days   Lab Units 01/11/21  0954 01/08/21  1241   SODIUM mmol/L 135* 136   POTASSIUM mmol/L 4.5 4.5   CHLORIDE mmol/L 100 100   CO2 mmol/L 24.6 21.8*   BUN mg/dL 14 19   CREATININE mg/dL 1.43* 1.34*   CALCIUM mg/dL 8.5* 8.4*   BILIRUBIN mg/dL 0.8 0.5   ALK PHOS U/L 546* 450*   ALT (SGPT) U/L 352* 376*   AST (SGOT) U/L 182* 206*   GLUCOSE mg/dL 129* 122*     Results from last 7 days   Lab Units 01/11/21  0954 01/08/21  1241   WBC 10*3/mm3 5.57 6.23   HEMOGLOBIN g/dL 13.5 13.5   HEMATOCRIT % 43.5 43.4   PLATELETS 10*3/mm3 284 214         Results from last 7 days   Lab Units  01/11/21  0954   TROPONIN T ng/mL <0.010     Results from last 7 days   Lab Units 01/11/21  0954   PROBNP pg/mL 79.5             Results from last 7 days   Lab Units 01/11/21  1036   PH, ARTERIAL pH units 7.436   PO2 ART mm Hg 65.6*   PCO2, ARTERIAL mm Hg 38.1   HCO3 ART mmol/L 25.6           Invalid input(s): USDES,  BLOODU, NITRITITE, BACT, EP  Pain Management Panel     There is no flowsheet data to display.              EKG:      Sinus rhythm with nonspecific T wave changed with a rate of 95    Radiology:    Imaging Results (Last 72 Hours)     Procedure Component Value Units Date/Time    XR Chest 1 View [[id number]] Collected: 01/11/21 1047     Updated: 01/11/21 1052    Narrative:      PROCEDURE: XR CHEST 1 VW-     HISTORY: soa, covid positive.     COMPARISON: None.     FINDINGS: The heart is normal in size. The mediastinum is unremarkable.  There has been interval worsening of the patient's bilateral airspace  disease consistent with Covid pneumonia. There is no pneumothorax.   There are no acute osseous abnormalities.       Impression:      Worsening bilateral airspace disease consistent with Covid  pneumonia.     Continued followup is recommended.     This report was finalized on 1/11/2021 10:50 AM by Mer Rosales M.D..          Assessment:    Acute respiratory failure with hypoxia  Pneumonia due to Covid-19   Transaminitis  Mild Renal Insufficiency  Obesity    Plan:    Plan to admit to the hospitalist service for further treatment.  Will treat with 5 days of Remdesivir and Decadron as well.  Will give Rocephin and Azithromycin for treatment of potential superimposed bacterial pneumonia.  Lovenox (pharmacy to dose) for DVT prophylaxis.  Continue oxygen therapy to titrate to keep saturations >90%.  Patient noted to have stable transaminitis on admission, will monitor with lab work, likely attributed to Covid-19, and follow-up with GI outpatient if needed.  Will give normal saline at 75ml/hr for milid  renal insufficiency.  Will give supportive care as ordered, monitor vital signs, lab work, and pain.  Patient agreeable to plan.       Advance Care Planning   ACP discussion was held with the patient during this visit. Patient does not have an advance directive, declines further assistance.      Terri Anaya, APRN  01/11/21  12:22 EST    Dictated utilizing Dragon dictation.

## 2021-01-12 ENCOUNTER — APPOINTMENT (OUTPATIENT)
Dept: CT IMAGING | Facility: HOSPITAL | Age: 46
End: 2021-01-12

## 2021-01-12 LAB
ALBUMIN SERPL-MCNC: 3.2 G/DL (ref 3.5–5.2)
ALBUMIN/GLOB SERPL: 0.9 G/DL
ALP SERPL-CCNC: 487 U/L (ref 39–117)
ALT SERPL W P-5'-P-CCNC: 275 U/L (ref 1–41)
ANION GAP SERPL CALCULATED.3IONS-SCNC: 10.5 MMOL/L (ref 5–15)
AST SERPL-CCNC: 124 U/L (ref 1–40)
BASOPHILS # BLD AUTO: 0.01 10*3/MM3 (ref 0–0.2)
BASOPHILS NFR BLD AUTO: 0.3 % (ref 0–1.5)
BILIRUB CONJ SERPL-MCNC: 0.2 MG/DL (ref 0–0.3)
BILIRUB SERPL-MCNC: 0.5 MG/DL (ref 0–1.2)
BUN SERPL-MCNC: 16 MG/DL (ref 6–20)
BUN/CREAT SERPL: 13.6 (ref 7–25)
CALCIUM SPEC-SCNC: 8.1 MG/DL (ref 8.6–10.5)
CHLORIDE SERPL-SCNC: 101 MMOL/L (ref 98–107)
CK SERPL-CCNC: 274 U/L (ref 20–200)
CO2 SERPL-SCNC: 22.5 MMOL/L (ref 22–29)
CREAT SERPL-MCNC: 1.18 MG/DL (ref 0.76–1.27)
CRP SERPL-MCNC: 12.75 MG/DL (ref 0–0.5)
D DIMER PPP FEU-MCNC: 1.15 MCGFEU/ML (ref 0–0.57)
DEPRECATED RDW RBC AUTO: 38.1 FL (ref 37–54)
EOSINOPHIL # BLD AUTO: 0 10*3/MM3 (ref 0–0.4)
EOSINOPHIL NFR BLD AUTO: 0 % (ref 0.3–6.2)
ERYTHROCYTE [DISTWIDTH] IN BLOOD BY AUTOMATED COUNT: 14.6 % (ref 12.3–15.4)
FERRITIN SERPL-MCNC: 2661 NG/ML (ref 30–400)
FIBRINOGEN PPP-MCNC: 761 MG/DL (ref 209–518)
GFR SERPL CREATININE-BSD FRML MDRD: 66 ML/MIN/1.73
GLOBULIN UR ELPH-MCNC: 3.7 GM/DL
GLUCOSE SERPL-MCNC: 135 MG/DL (ref 65–99)
HCT VFR BLD AUTO: 37.8 % (ref 37.5–51)
HGB BLD-MCNC: 11.6 G/DL (ref 13–17.7)
HYPOCHROMIA BLD QL: NORMAL
IMM GRANULOCYTES # BLD AUTO: 0.03 10*3/MM3 (ref 0–0.05)
IMM GRANULOCYTES NFR BLD AUTO: 0.8 % (ref 0–0.5)
LDH SERPL-CCNC: 469 U/L (ref 135–225)
LYMPHOCYTES # BLD AUTO: 1.16 10*3/MM3 (ref 0.7–3.1)
LYMPHOCYTES NFR BLD AUTO: 30.9 % (ref 19.6–45.3)
MCH RBC QN AUTO: 22.2 PG (ref 26.6–33)
MCHC RBC AUTO-ENTMCNC: 30.7 G/DL (ref 31.5–35.7)
MCV RBC AUTO: 72.4 FL (ref 79–97)
MICROCYTES BLD QL: NORMAL
MONOCYTES # BLD AUTO: 0.16 10*3/MM3 (ref 0.1–0.9)
MONOCYTES NFR BLD AUTO: 4.3 % (ref 5–12)
NEUTROPHILS NFR BLD AUTO: 2.4 10*3/MM3 (ref 1.7–7)
NEUTROPHILS NFR BLD AUTO: 63.7 % (ref 42.7–76)
NRBC BLD AUTO-RTO: 0 /100 WBC (ref 0–0.2)
PLAT MORPH BLD: NORMAL
PLATELET # BLD AUTO: 330 10*3/MM3 (ref 140–450)
PMV BLD AUTO: 10.2 FL (ref 6–12)
POTASSIUM SERPL-SCNC: 5.2 MMOL/L (ref 3.5–5.2)
PROT SERPL-MCNC: 6.9 G/DL (ref 6–8.5)
RBC # BLD AUTO: 5.22 10*6/MM3 (ref 4.14–5.8)
SODIUM SERPL-SCNC: 134 MMOL/L (ref 136–145)
WBC # BLD AUTO: 3.76 10*3/MM3 (ref 3.4–10.8)
WBC MORPH BLD: NORMAL

## 2021-01-12 PROCEDURE — 86140 C-REACTIVE PROTEIN: CPT | Performed by: NURSE PRACTITIONER

## 2021-01-12 PROCEDURE — 83615 LACTATE (LD) (LDH) ENZYME: CPT | Performed by: NURSE PRACTITIONER

## 2021-01-12 PROCEDURE — 82550 ASSAY OF CK (CPK): CPT | Performed by: NURSE PRACTITIONER

## 2021-01-12 PROCEDURE — 85384 FIBRINOGEN ACTIVITY: CPT | Performed by: NURSE PRACTITIONER

## 2021-01-12 PROCEDURE — 99232 SBSQ HOSP IP/OBS MODERATE 35: CPT | Performed by: NURSE PRACTITIONER

## 2021-01-12 PROCEDURE — 63710000001 DEXAMETHASONE PER 0.25 MG: Performed by: NURSE PRACTITIONER

## 2021-01-12 PROCEDURE — 80053 COMPREHEN METABOLIC PANEL: CPT | Performed by: NURSE PRACTITIONER

## 2021-01-12 PROCEDURE — 85379 FIBRIN DEGRADATION QUANT: CPT | Performed by: NURSE PRACTITIONER

## 2021-01-12 PROCEDURE — 25010000002 CEFTRIAXONE SODIUM-DEXTROSE 1-3.74 GM-%(50ML) RECONSTITUTED SOLUTION: Performed by: NURSE PRACTITIONER

## 2021-01-12 PROCEDURE — 82728 ASSAY OF FERRITIN: CPT | Performed by: NURSE PRACTITIONER

## 2021-01-12 PROCEDURE — 25010000002 ENOXAPARIN PER 10 MG: Performed by: NURSE PRACTITIONER

## 2021-01-12 PROCEDURE — 25010000002 AZITHROMYCIN 500 MG/250 ML: Performed by: NURSE PRACTITIONER

## 2021-01-12 PROCEDURE — 85007 BL SMEAR W/DIFF WBC COUNT: CPT | Performed by: NURSE PRACTITIONER

## 2021-01-12 PROCEDURE — 82248 BILIRUBIN DIRECT: CPT | Performed by: NURSE PRACTITIONER

## 2021-01-12 PROCEDURE — 25010000002 IOPAMIDOL 61 % SOLUTION

## 2021-01-12 PROCEDURE — 71275 CT ANGIOGRAPHY CHEST: CPT

## 2021-01-12 PROCEDURE — 85025 COMPLETE CBC W/AUTO DIFF WBC: CPT | Performed by: NURSE PRACTITIONER

## 2021-01-12 RX ORDER — AMINO ACIDS/PROTEIN HYDROLYS 15G-100/30
30 LIQUID (ML) ORAL 2 TIMES DAILY
Status: DISCONTINUED | OUTPATIENT
Start: 2021-01-12 | End: 2021-01-14 | Stop reason: HOSPADM

## 2021-01-12 RX ADMIN — REMDESIVIR 100 MG: 100 INJECTION, POWDER, LYOPHILIZED, FOR SOLUTION INTRAVENOUS at 16:01

## 2021-01-12 RX ADMIN — IOPAMIDOL 100 ML: 612 INJECTION, SOLUTION INTRAVENOUS at 05:15

## 2021-01-12 RX ADMIN — Medication 5 MG: at 23:33

## 2021-01-12 RX ADMIN — DEXAMETHASONE 6 MG: 2 TABLET ORAL at 09:12

## 2021-01-12 RX ADMIN — AZITHROMYCIN 500 MG: 500 INJECTION, POWDER, LYOPHILIZED, FOR SOLUTION INTRAVENOUS at 12:08

## 2021-01-12 RX ADMIN — SODIUM CHLORIDE, PRESERVATIVE FREE 10 ML: 5 INJECTION INTRAVENOUS at 09:13

## 2021-01-12 RX ADMIN — CEFTRIAXONE 1 G: 1 INJECTION, SOLUTION INTRAVENOUS at 11:09

## 2021-01-12 RX ADMIN — ENOXAPARIN SODIUM 60 MG: 60 INJECTION SUBCUTANEOUS at 16:01

## 2021-01-12 RX ADMIN — SODIUM CHLORIDE, PRESERVATIVE FREE 10 ML: 5 INJECTION INTRAVENOUS at 20:52

## 2021-01-12 NOTE — PROGRESS NOTES
"Adult Nutrition  Assessment/PES    Patient Name:  Kraig Stafford  YOB: 1975  MRN: [id number]  Admit Date:  1/11/2021    Assessment Date:  1/12/2021    Comments:    Recommend:  1. Continue current diet order as medically appropriate and tolerated.  2. Encourage PO intake. PO intake average ~100% x 1 meal.  3. RD ordered Prostat BID.  4. Consider a multivitamin with minerals daily.  5. Continue to monitor and replace electrolytes PRN.     RD to follow pt and available PRN.      Reason for Assessment     Row Name 01/12/21 1545          Reason for Assessment    Reason For Assessment  diagnosis/disease state;identified at risk by screening criteria     Diagnosis  pulmonary disease;renal disease Acute respiratory failure with hypoxia, PNA, COVID-19 virus, Transaminitis, Mild renal insufficiency     Identified At Risk by Screening Criteria  BMI           Anthropometrics     Row Name 01/12/21 1546          Anthropometrics    Height  190.5 cm (75\")        Ideal Body Weight (IBW)    Ideal Body Weight (IBW) (kg)  90.45         Labs/Tests/Procedures/Meds     Row Name 01/12/21 1546          Labs/Procedures/Meds    Lab Results Reviewed  reviewed, pertinent     Lab Results Comments  Low: Na+ High: Gluc, ALT, CRP, Procal        Medications    Pertinent Medications Reviewed  reviewed, pertinent     Pertinent Medications Comments  Decadron         Physical Findings     Row Name 01/12/21 1546          Physical Findings    Overall Physical Appearance  obese         Estimated/Assessed Needs     Row Name 01/12/21 1546          Calculation Measurements    Weight Used For Calculations  88.9 kg (196 lb) IBW     Height  190.5 cm (75\")        Estimated/Assessed Needs    Additional Documentation  Calorie Requirements (Group);Protein Requirements (Group);[place] Telles Equation (Group);Fluid Requirements (Group)        Calorie Requirements    Estimated Calorie Need Method  [place]-St Telles     Estimated Calorie " Requirement Comment  9068 - 4953        [place]-St. Jeor Equation    RMR ([place]-St. Jeor Equation)  1854.675     [place]-St. Jeor Activity Factors  -- AF 1.3        Protein Requirements    Weight Used For Protein Calculations  88.9 kg (196 lb) IBW     Est Protein Requirement Amount (gms/kg)  1.2 gm protein 72 - 106 gm     Estimated Protein Requirements (gms/day)  106.69        Fluid Requirements    Estimated Fluid Requirement Method  Jonel-Segar Formula     [place]-Segar Method (over 20 kg)  3278.1         Nutrition Prescription Ordered     Row Name 01/12/21 1547          Nutrition Prescription PO    Current PO Diet  Regular         Evaluation of Received Nutrient/Fluid Intake     Row Name 01/12/21 1548          PO Evaluation    Number of Days PO Intake Evaluated  1 day     Number of Meals  1     % PO Intake  100               Problem/Interventions:  Problem 1     Row Name 01/12/21 1548          Nutrition Diagnoses Problem 1    Problem 1  Increased Nutrient Needs     Macronutrient  Kcal;Protein     Etiology (related to)  Medical Diagnosis     Pulmonary/Critical Care  Pneumonia;Other (comment) COVID-19 virus     Signs/Symptoms (evidenced by)  Report/Observation     Reported/Observed By  MD         Problem 2     Row Name 01/12/21 1548          Nutrition Diagnoses Problem 2    Problem 2  Overweight/Obesity     Etiology (related to)  Factors Affecting Nutrition     Food Habit/Preferences  Large Meals     Signs/Symptoms (evidenced by)  BMI     BMI  35 - 39.9             Intervention Goal     Row Name 01/12/21 1549          Intervention Goal    General  Meet nutritional needs for age/condition;Improved nutrition related lab(s)     PO  Meet estimated needs;Maintain intake;PO intake (%)     PO Intake %  -- 75 - 100%     Weight  No significant weight loss         Nutrition Intervention     Row Name 01/12/21 1549          Nutrition Intervention    RD/Tech Action  Follow Tx progress;Encourage intake;Recommend/ordered      Recommended/Ordered  Supplement         Nutrition Prescription     Row Name 01/12/21 1549          Nutrition Prescription PO    PO Prescription  Begin/change supplement;Other (comment) Continue current diet order as medically appropriate and tolerated     Supplement  PRO liquid     Supplement Frequency  2 times a day     New PO Prescription Ordered?  No, recommended        Other Orders    Obtain Weight  Daily     Obtain Weight Ordered?  No, recommended     Supplement  Vitamin mineral supplement     Supplement Ordered?  No, recommended     Other  Continue to monitor and replace electrolytes PRN         Education/Evaluation     Row Name 01/12/21 3043          Education    Education  Education not appropriate at this time     Please explain  Other (comment) Pt isolation status        Monitor/Evaluation    Monitor  Per protocol;I&O;PO intake;Supplement intake;Pertinent labs;Weight;Skin status           Electronically signed by:  Latisha Ventura RD  01/12/21 15:51 EST

## 2021-01-12 NOTE — PROGRESS NOTES
[facility]    Name:  Kraig Stafford   Age:  45 y.o.  Sex:  male  :  1975  MRN:  [id number]   Visit Number:  [id number]  Admission Date:  2021  Date Of Service:  21  Primary Care Physician:  Provider, No Known     LOS: 1 day :  Patient Care Team:  Provider, No Known as PCP - General:    Chief Complaint:      Shortness of breath    Subjective / Interval History:     Patient admitted for Covid 19 pneumonia yesterday after having worsening symptoms over the past 6 days.  Patient admitted on 3L and had to have O2 turned up last night to 5L.  Currently resting in bed on 5L satting 95%.  Patient states his shortness of breath is a little bit better but is still bad with any movement.  Patient did have a CT to rule out PE that was negative last night after having oxygen saturations drop with movement.      Review of Systems:     General ROS: Patient denies any fevers, chills or loss of consciousness, +fatigue  Respiratory ROS: + cough or shortness of breath.  Cardiovascular ROS: Denies chest pain or palpitations. No history of exertional chest pain.  Gastrointestinal ROS: Denies nausea and vomiting. Denies any abdominal pain. No diarrhea.  Neurological ROS: Denies any focal weakness. No loss of consciousness. Denies any numbness.  Dermatological ROS: Denies any redness or pruritis.    Vital Signs:    Temp:  [96.4 °F (35.8 °C)-100.3 °F (37.9 °C)] 96.6 °F (35.9 °C)  Heart Rate:  [68-95] 81  Resp:  [8-20] 8  BP: (129-140)/(72-88) 140/87    Intake and output:    I/O last 3 completed shifts:  In:  [P.O.:600; I.V.:1056; IV Piggyback:300]  Out:  [Urine:]  I/O this shift:  In: -   Out: 350 [Urine:350]    Physical Examination:    General Appearance:  Alert and cooperative, not in any acute distress.   Head:  Atraumatic and normocephalic, without obvious abnormality.   Eyes:          Conjunctivae and sclerae normal, no Icterus. No pallor.  Extraocular movements are within normal limits.   Neck: Supple, trachea midline   Lungs:   Chest shape is normal. Breath sounds heard bilaterally equally.  BL crackles noted to bases.  No wheezing. No pleural rub or bronchial breathing.   Heart:  RRR, no murmur, no gallop, no rub. No JVD   Abdomen:   Normal bowel sounds, no masses, no organomegaly. Soft, nontender, nondistended, no guarding, no rebound tenderness.   Extremities: Moves all extremities, no edema, no cyanosis, no clubbing.   Skin: No bleeding, bruising or rash.   Neurologic: Awake, alert and oriented times 3. Moves all 4 extremities equally.     Laboratory results:    Results from last 7 days   Lab Units 01/12/21  0504 01/11/21  0954 01/08/21  1241   SODIUM mmol/L 134* 135* 136   POTASSIUM mmol/L 5.2 4.5 4.5   CHLORIDE mmol/L 101 100 100   CO2 mmol/L 22.5 24.6 21.8*   BUN mg/dL 16 14 19   CREATININE mg/dL 1.18 1.43* 1.34*   CALCIUM mg/dL 8.1* 8.5* 8.4*   BILIRUBIN mg/dL 0.5 0.8 0.5   ALK PHOS U/L 487* 546* 450*   ALT (SGPT) U/L 275* 352* 376*   AST (SGOT) U/L 124* 182* 206*   GLUCOSE mg/dL 135* 129* 122*     Results from last 7 days   Lab Units 01/12/21  0504 01/11/21  0954 01/08/21  1241   WBC 10*3/mm3 3.76 5.57 6.23   HEMOGLOBIN g/dL 11.6* 13.5 13.5   HEMATOCRIT % 37.8 43.5 43.4   PLATELETS 10*3/mm3 330 284 214         Results from last 7 days   Lab Units 01/12/21  0504 01/11/21  0954   CK TOTAL U/L 274*  --    TROPONIN T ng/mL  --  <0.010     Results from last 7 days   Lab Units 01/11/21  1057 01/11/21  1050   BLOODCX  No growth at 24 hours No growth at 24 hours     Results from last 7 days   Lab Units 01/11/21  1036   PH, ARTERIAL pH units 7.436   PO2 ART mm Hg 65.6*   PCO2, ARTERIAL mm Hg 38.1   HCO3 ART mmol/L 25.6       I have reviewed the patient's laboratory results.    Radiology results:    Imaging Results (Last 24 Hours)     Procedure Component Value Units Date/Time    CT Chest Pulmonary Embolism [[id number]] Collected: 01/12/21 0729      Updated: 01/12/21 0733    Narrative:      PROCEDURE: CT CHEST PULMONARY EMBOLISM-     HISTORY: PE suspected, low/intermediate prob, positive D-dimer;  U07.1-COVID-19; J12.82-Pneumonia due to Coronavirus disease 2019;  J96.01-Acute respiratory failure with hypoxia; R74.01-Elevation of  levels of liver transaminase levels; N28.9-Disorder of kidney and  ureter, unspecified; E66.9-Obesity, unspecified; Z68.35-Body mass index  (BMI) 35.0-35.9, adult     COMPARISON: None .     TECHNIQUE: Multiple axial CT images were obtained from the thoracic  inlet through the upper abdomen following the administration of Isovue  300 per the CT PE protocol. Coronal and oblique 3D MIP images were  reconstructed from the original axial data set.      FINDINGS: There are no filling defects within the pulmonary arteries to  suggest an embolus. The thoracic aorta is normal in caliber with no  evidence of dissection. The heart is normal in size. There are no  pleural or pericardial effusions. There is no adenopathy. Lung windows  reveal diffuse bilateral groundglass opacities consistent with Covid  pneumonia. Within the visualized upper abdomen there is fatty  infiltration of the liver. The upper abdomen is otherwise unremarkable.  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 Bone windows reveal no acute osseous abnormalities.       Impression:      1. No evidence of pulmonary embolus or dissection.  2. Diffuse bilateral groundglass opacities consistent with Covid  pneumonia.           536.65 mGy.cm        This study was performed with techniques to keep radiation doses as low  as reasonably achievable (ALARA). Individualized dose reduction  techniques using automated exposure control or adjustment of mA and/or  kV according to the patient size were employed.      This report was finalized on 1/12/2021 7:31 AM by Mer Rosales M.D..          I have reviewed the patient's radiology  reports.    Medication Review:     I have reviewed the patient's active and prn medications.     Problem List:      Pneumonia due to COVID-19 virus      Assessment:    Acute respiratory failure with hypoxia  Pneumonia due to Covid-19   Transaminitis  Mild Renal Insufficiency  Obesity    Plan:    Will continue Remdesivir and Decadron therapies for Covid 19 as well as Rocephin and Azithromycin.  Continue Lovenox for DVT prophylaxis.  Continue oxygen therapy to keep oxygen saturations >90%.  Patient's labwork is stable today.  Creatinine improved at 1.18.  Continue supportive care as ordered, will continue to monitor vital signs, labs and pain.  Patient in good spirits and agreeable to plan of care.    Terri Anaya, APRN  01/12/21  12:27 EST    Dictated utilizing Dragon dictation.

## 2021-01-12 NOTE — PROGRESS NOTES
Discharge Planning Assessment  [facility]     Patient Name: Kraig Stafford  MRN: [id number]  Today's Date: 1/12/2021    Admit Date: 1/11/2021    Discharge Needs Assessment     Row Name 01/12/21 1522       Living Environment    Lives With  spouse    Current Living Arrangements  home/apartment/condo    Primary Care Provided by  self    Provides Primary Care For  no one    Family Caregiver if Needed  none    Quality of Family Relationships  supportive    Able to Return to Prior Arrangements  yes       Resource/Environmental Concerns    Resource/Environmental Concerns  none    Transportation Concerns  car, none       Transition Planning    Patient/Family Anticipates Transition to  home    Patient/Family Anticipated Services at Transition  none    Transportation Anticipated  car, drives self;family or friend will provide       Discharge Needs Assessment    Readmission Within the Last 30 Days  no previous admission in last 30 days    Equipment Currently Used at Home  oxygen    Concerns Comments  needs primary care provider    Anticipated Changes Related to Illness  none    Equipment Needed After Discharge  oxygen    Provided Post Acute Provider List?  Yes    Post Acute Provider List  DME Supplier;Home Health primary care provider    Provided Post Acute Provider Quality & Resource List?  Yes        Discharge Plan     Row Name 01/12/21 4534       Plan    Plan Spoke with patient on phone.  No POA/Advanced directive. Demographic information verified.  No PCP.  Patient is an independent,  man who works full time.  No home health services or HH equipment.  Enrolled in Meds to beds, password set up, case management contact card, primary care, DME, and HH provider lists given to patient.  No CM needs at this time.          Continued Care and Services - Admitted Since 1/11/2021    Coordination has not been started for this encounter.         Demographic Summary     Row Name 01/12/21 1519       General Information     Referral Source  admission list    Reason for Consult  discharge planning    Preferred Language  English     Used During This Interaction  no    Row Name 01/12/21 1439       General Information    Admission Type  inpatient    Arrived From  emergency department        Functional Status     Row Name 01/12/21 1521       Functional Status    Usual Activity Tolerance  excellent    Current Activity Tolerance  excellent       Functional Status, IADL    Medications  independent    Meal Preparation  independent    Housekeeping  independent    Laundry  independent    Shopping  independent       Mental Status    General Appearance WDL  WDL       Mental Status Summary    Recent Changes in Mental Status/Cognitive Functioning  no changes       Employment/    Employment Status  employed full-time    Shift Worked  first shift    Current or Previous Occupation  education        Psychosocial     Row Name 01/12/21 1522       Values/Beliefs    Spiritual, Cultural Beliefs, Shinto Practices, Values that Affect Care  no       Behavior WDL    Behavior WDL  WDL       Emotion Mood WDL    Emotion/Mood/Affect WDL  WDL       Speech WDL    Speech WDL  WDL       Perceptual State WDL    Perceptual State WDL  WDL       Thought Process WDL    Thought Process WDL  WDL       Intellectual Performance WDL    Intellectual Performance WDL  WDL       Coping/Stress    Sources of Support  spouse    Reaction to Health Status  adjusting    Understanding of Condition and Treatment  adequate understanding of medical condition;adequate understanding of treatment       C-SSRS (Recent)    Q1 Wished to be Dead (Past Month)  no    Q2 Suicidal Thoughts (Past Month)  no    Q6 Suicide Behavior (Lifetime)  no       Violence Risk    Feels Like Hurting Others  no    Previous Attempt to Harm Others  no        Abuse/Neglect    No documentation.       Legal    No documentation.       Substance Abuse    No documentation.       Patient Forms     Row Name  01/12/21 1527       Patient Forms    Provider Choice List  Delivered    Delivered to  Patient    Method of delivery  In person            Jerri Granger, RN

## 2021-01-12 NOTE — PAYOR COMM NOTE
"TO:BC  FROM:PJ DENIS, RN PHONE [phone]  IN CLINICALS REF# [id number]    Kraig Wilkinson (45 y.o. Male)     Date of Birth Social Security Number Address Home Phone MRN    1975  [address] [place], [place] [address] [phone] [id number]    Taoism Marital Status          None        Admission Date Admission Type Admitting Provider Attending Provider Department, Room/Bed    1/11/21 Emergency Kade Valencia DO Shields, Morgan Blanton, DO [facility] MED SURG  3, 326/1    Discharge Date Discharge Disposition Discharge Destination                       Attending Provider: Kade Valencia DO    Allergies: No Known Allergies    Isolation: Enh Drop/Con, Contact Air   Infection: COVID (confirmed) (01/11/21)   Code Status: CPR    Ht: 190.5 cm (75\")   Wt: 128 kg (281 lb 15.5 oz)    Admission Cmt: None   Principal Problem: None                Active Insurance as of 1/11/2021     Primary Coverage     Payor Plan Insurance Group Employer/Plan Group    [facility] BL[employer] CR[employer] EMPLOYEE [id number]     Payor Plan Address Payor Plan Phone Number Payor Plan Fax Number Effective Dates    [address] [phone]  10/1/2019 - None Entered    [place] [address]       Subscriber Name Subscriber Birth Date Member ID       KRAIG WILKINSON 1975 [id number]                 Emergency Contacts      (Rel.) Home Phone Work Phone Mobile Phone    JAJAJAKE EGAN (Spouse) [phone] -- --    JAJA JAYY (Mother) [phone] -- --               History & Physical      Terri Anaya APRN at 01/11/21 1221     Attestation signed by Kade Valencia DO at 01/12/21 0755    I have reviewed all pertinent medical data including labs and imaging.  I agree with medical decision making nurse practitioner.                      [facility] HOSPITALIST   HISTORY AND PHYSICAL      Name:  Kraig Shin " Rivera   Age:  45 y.o.  Sex:  male  :  1975  MRN:  [id number]   Visit Number:  [id number]  Admission Date:  2021  Date Of Service:  21  Primary Care Physician:  Provider, No Known    Chief Complaint:     Shortness of Breath    History Of Presenting Illness:      Patient is 45 year old  male that presented to the Emergency Department today after being diagnosed with Covid 19 6 days ago with worsening symptoms.  Patient complained of worsening shortness of breath and states that his pulse ox at home was reading in the mid 70s with ambulation.  Room air saturations on arrival were 87%.  Oxygen at 3L improved to 92%.  Patient with no significant health history and states he does not really go to the doctor.  He does not have a PCP.  He is  with 2 grown children.  He says his wife has been taking care of him from a distance.      Workup in the ED revealed CXR with worsening BL infiltrates compared to 3 days ago.  Renal insufficiency noted with Creatinine-1.43 compared to 1.34 on prior visit 3 days ago.  Transaminitis stable compared to 3 days prior with ALK Phos-546, AST-182, and ALT-352.  Patient received Decadron and Rocephin/ Azithromycin while in the ED.  ABG stable in the ED.  Vital signs stable on admission.  Will admit to the hospitalist service for further treatment.    Review Of Systems:     General ROS: Patient denies any fevers, chills or loss of consciousness.  +generalized weakness/ malaise  Psychological ROS: No history of any hallucinations and delusions.  Ophthalmic ROS: No history of any diplopia or transient loss of vision.  ENT ROS: No history of sore throat, nasal congestion or ear pain.   Allergy and Immunology ROS: No history of rash or itching.  Hematological and Lymphatic ROS: No history of neck swelling or easy bleeding.  Endocrine ROS: No history of any recent unintentional weight gain or loss.  Respiratory ROS: + productive cough with blood per  patient report and shortness of breath.   Cardiovascular ROS: No chest pain or palpitations.   Gastrointestinal ROS: No history of nausea and vomiting. Denies any abdominal pain. No diarrhea.   GenitoUrinary ROS: No history of dysuria or hematuria.  Musculoskeletal ROS: No muscle pain. No calf pain.  Neurological ROS: No history of any focal weakness. No loss of consciousness. Denies any numbness.  Dermatological ROS: No history of any redness or pruritis.     Past Medical History:    History reviewed. No pertinent past medical history.    Past Surgical history:    Past Surgical History:   Procedure Laterality Date   • KNEE SURGERY         Social History:    Social History     Socioeconomic History   • Marital status:      Spouse name: Not on file   • Number of children: Not on file   • Years of education: Not on file   • Highest education level: Not on file   Tobacco Use   • Smoking status: Never Smoker   Substance and Sexual Activity   • Alcohol use: Yes     Comment: occassionally   • Drug use: Never   • Sexual activity: Defer       Family History:  Reviewed    History reviewed. No pertinent family history.    Allergies:  Reviewed    Patient has no known allergies.    Home Medications:    Prior to Admission Medications     None             ED Medications:  Reviewed    Medications   sodium chloride 0.9 % flush 10 mL (has no administration in time range)   AZITHROMYCIN 500 MG/250 ML 0.9% NS IVPB (vial-mate) (500 mg Intravenous New Bag 1/11/21 1138)   dexamethasone sodium phosphate injection 6 mg (6 mg Intravenous Given 1/11/21 1034)   cefTRIAXone (ROCEPHIN) IVPB 1 g/50ml dextrose (premix) (0 g Intravenous Stopped 1/11/21 1138)   acetaminophen (TYLENOL) tablet 650 mg (650 mg Oral Given 1/11/21 1135)       Vital Signs:    Temp:  [99.9 °F (37.7 °C)] 99.9 °F (37.7 °C)  Heart Rate:  [88-98] 90  Resp:  [18-20] 20  BP: (130-147)/(77-92) 130/77        01/11/21  0946   Weight: 129 kg (285 lb)       Body mass index is  35.62 kg/m².    Physical Exam:    General Appearance:  Alert and cooperative, not in any acute distress, resting in ED stretcher during examination   Head:  Atraumatic and normocephalic, without obvious abnormality.   Eyes:          Conjunctivae and sclerae normal, no Icterus. No pallor. Extraocular movements are within normal limits.   Ears:  Ears appear intact with no abnormalities noted.   Throat: No oral lesions, no thrush, oral mucosa moist.   Neck: Supple, trachea midline   Back:   No kyphoscoliosis present. No tenderness to palpation,   range of motion normal.   Lungs:   Chest shape is normal. Breath sounds heard bilaterally equally but diminished. BL crackles to bases. No Pleural rub or bronchial breathing.   Heart:  RRR, no murmur, no gallop, no rub. No JVD.   Abdomen:   Normal bowel sounds, no masses, no organomegaly. Soft, nontender, nondistended, no guarding, no rebound tenderness.   Extremities: Moves all extremities, no edema, no cyanosis, no clubbing.   Pulses: Pulses palpable and equal bilaterally.   Skin: No bleeding, bruising or rash.   Neurologic: Alert and oriented x 3. Moves all four limbs equally. No tremors. No facial asymmetry.     Laboratory data:    I have reviewed the labs done in the emergency room.    Results from last 7 days   Lab Units 01/11/21  0954 01/08/21  1241   SODIUM mmol/L 135* 136   POTASSIUM mmol/L 4.5 4.5   CHLORIDE mmol/L 100 100   CO2 mmol/L 24.6 21.8*   BUN mg/dL 14 19   CREATININE mg/dL 1.43* 1.34*   CALCIUM mg/dL 8.5* 8.4*   BILIRUBIN mg/dL 0.8 0.5   ALK PHOS U/L 546* 450*   ALT (SGPT) U/L 352* 376*   AST (SGOT) U/L 182* 206*   GLUCOSE mg/dL 129* 122*     Results from last 7 days   Lab Units 01/11/21  0954 01/08/21  1241   WBC 10*3/mm3 5.57 6.23   HEMOGLOBIN g/dL 13.5 13.5   HEMATOCRIT % 43.5 43.4   PLATELETS 10*3/mm3 284 214         Results from last 7 days   Lab Units 01/11/21  0954   TROPONIN T ng/mL <0.010     Results from last 7 days   Lab Units 01/11/21  0954    PROBNP pg/mL 79.5             Results from last 7 days   Lab Units 01/11/21  1036   PH, ARTERIAL pH units 7.436   PO2 ART mm Hg 65.6*   PCO2, ARTERIAL mm Hg 38.1   HCO3 ART mmol/L 25.6           Invalid input(s): USDES,  BLOODU, NITRITITE, BACT, EP  Pain Management Panel     There is no flowsheet data to display.              EKG:      Sinus rhythm with nonspecific T wave changed with a rate of 95    Radiology:    Imaging Results (Last 72 Hours)     Procedure Component Value Units Date/Time    XR Chest 1 View [[id number]] Collected: 01/11/21 1047     Updated: 01/11/21 1052    Narrative:      PROCEDURE: XR CHEST 1 VW-     HISTORY: soa, covid positive.     COMPARISON: None.     FINDINGS: The heart is normal in size. The mediastinum is unremarkable.  There has been interval worsening of the patient's bilateral airspace  disease consistent with Covid pneumonia. There is no pneumothorax.   There are no acute osseous abnormalities.       Impression:      Worsening bilateral airspace disease consistent with Covid  pneumonia.     Continued followup is recommended.     This report was finalized on 1/11/2021 10:50 AM by Mer Rosales M.D..          Assessment:    Acute respiratory failure with hypoxia  Pneumonia due to Covid-19   Transaminitis  Mild Renal Insufficiency  Obesity    Plan:    Plan to admit to the hospitalist service for further treatment.  Will treat with 5 days of Remdesivir and Decadron as well.  Will give Rocephin and Azithromycin for treatment of potential superimposed bacterial pneumonia.  Lovenox (pharmacy to dose) for DVT prophylaxis.  Continue oxygen therapy to titrate to keep saturations >90%.  Patient noted to have stable transaminitis on admission, will monitor with lab work, likely attributed to Covid-19, and follow-up with GI outpatient if needed.  Will give normal saline at 75ml/hr for milid renal insufficiency.  Will give supportive care as ordered, monitor vital signs, lab work, and pain.   Patient agreeable to plan.       Advance Care Planning   ACP discussion was held with the patient during this visit. Patient does not have an advance directive, declines further assistance.      Terri Anaya, APRN  01/11/21  12:22 EST    Dictated utilizing Dragon dictation.    Electronically signed by Kade Valencia DO at 01/12/21 0749          Emergency Department Notes      Jake Cespedes,  at 01/11/21 1042          Subjective   History of Present Illness    Chief Complaint: Shortness of breath cough  History of Present Illness: 45-year-old male, Covid +6 days ago, have been having symptoms for the last 10 to 14 days, worsening shortness of breath, and associated hypoxia at home today, states he was monitoring his sats with pulse ox and they were at 87% at home.  Was seen and evaluated in ER 3 days ago had chest x-ray confirming Covid pneumonia.  Room air sats were 94% at that time.  Presents with worsening symptoms.  Onset: Ongoing issue, see above narrative  Duration: Persistent worsening  Exacerbating / Alleviating factors: Use of over-the-counter medications without relief  Associated symptoms: None      Nurses Notes reviewed and agree, including vitals, allergies, social history and prior medical history.     REVIEW OF SYSTEMS: All systems reviewed and not pertinent unless noted.    Positive for: Shortness of breath cough    Negative for: Fever hemoptysis chest pain palpitations syncope abdominal pain diarrhea flank pain urinary symptoms rash  Review of Systems    History reviewed. No pertinent past medical history.    No Known Allergies    Past Surgical History:   Procedure Laterality Date   • KNEE SURGERY         History reviewed. No pertinent family history.    Social History     Socioeconomic History   • Marital status:      Spouse name: Not on file   • Number of children: Not on file   • Years of education: Not on file   • Highest education level: Not on file   Tobacco Use   •  Smoking status: Never Smoker   Substance and Sexual Activity   • Alcohol use: Yes     Comment: occassionally   • Drug use: Never   • Sexual activity: Defer           Objective   Physical Exam    GENERAL APPEARANCE: Well developed, obese 45-year-old black male,  in no acute distress.  VITAL SIGNS: per nursing, reviewed and noted  SKIN: exposed skin with no rashes, ulcerations or petechiae.  Head: Normocephalic, atraumatic.   EYES: perrla. EOMI.  ENT: Normal voice.  Patient maintained wearing a mask throughout patient encounter due to coronavirus pandemic  LUNGS:  No increased work of breathing. No retractions.  Decreased breath sounds throughout  CARDIOVASCULAR:  regular rate and rhythm, no murmurs.  Good Peripheral pulses. Good cap refill to extremities.   ABDOMEN: Soft, nontender, normal bowel sounds. No hernia. No ascites.  MUSCULOSKELETAL:  No tenderness. Full ROM. Strength and tone normal.  NEUROLOGIC: Alert, oriented x 3. No gross deficits. GCS 15.   NECK: Supple, symmetric. No tenderness, no masses. Full ROM  Back: full rom, no paraspinal spasm. No CVA tenderness.   PSYCH: appropriate affect.  : no bladder tenderness or distention, no CVA tenderness      Procedures    No attending physician procedures were performed on this patient.      ED Course  ED Course as of Jan 11 1100   Mon Jan 11, 2021   1036 EKG interpreted by me reveals sinus rhythm rate 95.  Nonspecific diffuse T wave changes.  Diffuse T wave inversions    [PF]   1044 pH, Arterial: 7.436 [PF]   1044 pCO2, Arterial: 38.1 [PF]   1044 pO2, Arterial(!): 65.6 [PF]   1044 HCO3, Arterial: 25.6 [PF]   1044 Base Excess: 1.4 [PF]   1044 O2 Saturation, Arterial(!): 93.3 [PF]   1044 Hematocrit, Blood Gas: 40.0 [PF]   1044 Oxyhemoglobin(!): 91.7 [PF]   1044 Methemoglobin: 0.90 [PF]   1044 Carboxyhemoglobin: 0.8 [PF]   1044 WBC: 5.57 [PF]   1044 Hemoglobin: 13.5 [PF]   1044 Hematocrit: 43.5 [PF]   1044 Platelets: 284 [PF]   1044 Troponin T: <0.010 [PF]   1044  proBNP: 79.5 [PF]   1044 Glucose(!): 129 [PF]   1044 BUN: 14 [PF]   1044 Creatinine(!): 1.43 [PF]   1044 Sodium(!): 135 [PF]   1044 Potassium: 4.5 [PF]   1044 Chloride: 100 [PF]   1044 CO2: 24.6 [PF]   1044 Calcium(!): 8.5 [PF]   1044 Total Protein: 7.8 [PF]   1044 Albumin: 3.60 [PF]   1044 ALT (SGPT)(!): 352 [PF]   1044 AST (SGOT)(!): 182 [PF]   1044 Total Bilirubin: 0.8 [PF]   1044 Alkaline Phosphatase(!): 546 [PF]      ED Course User Index  [PF] Jake Cespedes W, DO         Chest x-ray single view interpreted by me reveals worsening bilateral patchy infiltrates compared to 3 days prior.  Negative troponin.  Mild renal insufficiency with creatinine 1.43, compared to 3 days prior was 1.34.  Stable transaminitis compared to 3 days prior with   alk phosphatase 546 normal bilirubin.     45-year-old male presents with worsening shortness of breath now associated with hypoxia with room air sats 87% at rest here.  Patient is requiring 3 L nasal cannula oxygen support, with improvement of sats up to 92%.  Patient otherwise normotensive, no tachycardia. patient was dosed with Decadron, after discussion with hospitalist added Rocephin Zithromax, patient will need admission.  Dr. Packer will admit, patient agreeable with plan.                                    MDM  Number of Diagnoses or Management Options     Amount and/or Complexity of Data Reviewed  Clinical lab tests: reviewed and ordered  Tests in the radiology section of CPT®: ordered and reviewed  Review and summarize past medical records: yes  Discuss the patient with other providers: yes  Independent visualization of images, tracings, or specimens: yes    Risk of Complications, Morbidity, and/or Mortality  Presenting problems: moderate  Diagnostic procedures: moderate  Management options: moderate  General comments: 35 minutes critical care time for assessment and management of a 45-year-old male with respiratory failure with hypoxia with worsening  Covid pneumonia.  Time includes assessment, examination, charting, ordering and interpretation of labs and diagnostic studies, interpretation of EKG, pulse ox interpretation, oxygen management, chest x-ray, formulation of care plan    Critical Care  Total time providing critical care: 30-74 minutes    Patient Progress  Patient progress: improved      Final diagnoses:   Pneumonia due to COVID-19 virus   Acute respiratory failure with hypoxia (CMS/HCC)   Transaminitis   Mild renal insufficiency   Class 2 obesity without serious comorbidity with body mass index (BMI) of 35.0 to 35.9 in adult, unspecified obesity type            Jake Cespedes DO  01/11/21 1100      Electronically signed by Jake Cespedes DO at 01/11/21 1100     Soila Rosado at 01/11/21 1103        Ernestine house supervisor stated there is no beds at this time and no orders for discharges. She stated she will contact us with a bed assignment a soon as possible.     Soila Rosado  01/11/21 1105      Electronically signed by Soila Rosado at 01/11/21 1105     Erlinda Mitchell, RN at 01/11/21 1239        Attempted to call RN back to give report, RN is w/ another pt and will call back for report.      Erlinda Mitchell, RN  01/11/21 1256      Electronically signed by Erlinda Mitchell, RN at 01/11/21 1256       Vital Signs (last day)     Date/Time   Temp   Temp src   Pulse   Resp   BP   Patient Position   SpO2    01/12/21 0700   96.4 (35.8)   Oral   --   20   137/81   Lying   --    01/12/21 0649   --   --   68   --   --   --   91    01/12/21 0346   98.3 (36.8)   Oral   77   20   135/76   Lying   90    01/11/21 2328   98.5 (36.9)   Oral   84   20   137/88   Lying   93    01/11/21 1945   100.3 (37.9)   Oral   --   20   --   Lying   --    01/11/21 1530   98.5 (36.9)   Oral   92   19   129/72   Lying   91    01/11/21 1405   97.9 (36.6)   Oral   --   18   136/81   Sitting   --    01/11/21 1313   --   --   89   --   --   --   94    01/11/21 1310   --   --   89    --   --   --   94    01/11/21 1307   --   --   89   --   --   --   95    01/11/21 1305   --   --   93   --   --   --   93    01/11/21 1302   --   --   88   --   --   --   94    01/11/21 1256   --   --   90   --   --   --   93    01/11/21 1249   --   --   89   --   --   --   92    01/11/21 1245   --   --   92   --   --   --   93    01/11/21 12:38:15   --   --   --   20   --   --   --    01/11/21 1230   --   --   95   --   138/85   --   94    01/11/21 1210   --   --   90   --   --   --   93    01/11/21 1207   --   --   90   --   --   --   94    01/11/21 1200   --   --   96   --   130/77   --   94    01/11/21 1155   --   --   96   --   --   --   93    01/11/21 1140   --   --   91   --   --   --   92    01/11/21 1135   --   --   93   --   --   --   95    01/11/21 1132   --   --   93   --   --   --   95    01/11/21 11:11:41   99.9 (37.7)   Oral   93   20   --   --   --    01/11/21 1053   --   --   91   --   --   --   94    01/11/21 1050   --   --   92   --   --   --   92    01/11/21 1045   --   --   88   --   --   --   91    01/11/21 1037   --   --   93   --   --   --   95    01/11/21 1035   --   --   95   --   --   --   94    01/11/21 1025   --   --   93   --   --   --   94    01/11/21 1006   --   --   93   --   --   --   93    01/11/21 1000   --   --   98   --   139/92   --   92    01/11/21 0955   --   --   92   --   --   --   92    01/11/21 0948   --   --   94   --   138/82   --   (!) 88    01/11/21 0946   --   --   95   18   147/87   --   (!) 87                Current Facility-Administered Medications   Medication Dose Route Frequency Provider Last Rate Last Admin   • acetaminophen (TYLENOL) tablet 650 mg  650 mg Oral Q4H PRN Terri Anaya APRN   650 mg at 01/11/21 2205   • albuterol sulfate HFA (PROVENTIL HFA;VENTOLIN HFA;PROAIR HFA) inhaler 2 puff  2 puff Inhalation Q6H PRN Terri Anaya APRN   2 puff at 01/11/21 1602   • AZITHROMYCIN 500 MG/250 ML 0.9% NS IVPB (vial-mate)  500 mg Intravenous Q24H Jaclyn  ALIZE Schneider       • benzonatate (TESSALON) capsule 100 mg  100 mg Oral TID PRN Terri Anaya APRN   100 mg at 01/11/21 2329   • cefTRIAXone (ROCEPHIN) IVPB 1 g/50ml dextrose (premix)  1 g Intravenous Q24H Terri Anaya APRN       • dexamethasone (DECADRON) tablet 6 mg  6 mg Oral Daily With Breakfast Terri Anaya APRN   6 mg at 01/12/21 0912    Or   • dexamethasone (DECADRON) injection 6 mg  6 mg Intravenous Daily With Breakfast Terri Anaya APRANTONIA       • enoxaparin (LOVENOX) syringe 60 mg  60 mg Subcutaneous Q24H Terri Anaya APRN   60 mg at 01/11/21 1602   • influenza vac split quad (FLUZONE,FLUARIX,AFLURIA,FLULAVAL) injection 0.5 mL  0.5 mL Intramuscular During Hospitalization Kade Valencia,        • melatonin tablet 5 mg  5 mg Oral Nightly PRN Terri Anaya APRN   5 mg at 01/11/21 2329   • ondansetron (ZOFRAN) tablet 4 mg  4 mg Oral Q6H PRN Terri Anaya APRN        Or   • ondansetron (ZOFRAN) injection 4 mg  4 mg Intravenous Q6H PRN Terri Anaya APRANTONIA       • Pharmacy Consult - Remdesivir   Does not apply Continuous PRN Terri Anaya APRANTONIA       • Pharmacy to Dose enoxaparin (LOVENOX)   Does not apply Continuous PRN Terri Anaya APRN       • remdesivir 100 mg in sodium chloride 0.9 % 250 mL IVPB (powder vial)  100 mg Intravenous Q24H Terri Anaya APRN       • sodium chloride 0.9 % flush 10 mL  10 mL Intravenous PRN Jake Cespedes DO       • sodium chloride 0.9 % flush 10 mL  10 mL Intravenous Q12H Terri Anaya APRN   10 mL at 01/12/21 0913   • sodium chloride 0.9 % flush 10 mL  10 mL Intravenous PRN Terri Anaya APRN           Lab Results (last 24 hours)     Procedure Component Value Units Date/Time    Ferritin [[id number]]  (Abnormal) Collected: 01/12/21 0504    Specimen: Blood Updated: 01/12/21 0702     Ferritin 2,661.00 ng/mL     Narrative:      Results may be falsely decreased if patient taking Biotin.      D-dimer,  Quantitative [[id number]]  (Abnormal) Collected: 01/12/21 0504    Specimen: Blood Updated: 01/12/21 0642     D-Dimer, Quantitative 1.15 MCGFEU/mL     CBC & Differential [[id number]]  (Abnormal) Collected: 01/12/21 0504    Specimen: Blood Updated: 01/12/21 0640    Narrative:      The following orders were created for panel order CBC & Differential.  Procedure                               Abnormality         Status                     ---------                               -----------         ------                     Scan Slide[[id number]]                                       Final result               CBC Auto Differential[[id number]]        Abnormal            Final result                 Please view results for these tests on the individual orders.    Scan Slide [[id number]] Collected: 01/12/21 0504    Specimen: Blood Updated: 01/12/21 0640     Hypochromia Slight/1+     Microcytes Slight/1+     WBC Morphology Normal     Platelet Morphology Normal    CBC Auto Differential [[id number]]  (Abnormal) Collected: 01/12/21 0504    Specimen: Blood Updated: 01/12/21 0640     WBC 3.76 10*3/mm3      RBC 5.22 10*6/mm3      Hemoglobin 11.6 g/dL      Hematocrit 37.8 %      MCV 72.4 fL      MCH 22.2 pg      MCHC 30.7 g/dL      RDW 14.6 %      RDW-SD 38.1 fl      MPV 10.2 fL      Platelets 330 10*3/mm3      Neutrophil % 63.7 %      Lymphocyte % 30.9 %      Monocyte % 4.3 %      Eosinophil % 0.0 %      Basophil % 0.3 %      Immature Grans % 0.8 %      Neutrophils, Absolute 2.40 10*3/mm3      Lymphocytes, Absolute 1.16 10*3/mm3      Monocytes, Absolute 0.16 10*3/mm3      Eosinophils, Absolute 0.00 10*3/mm3      Basophils, Absolute 0.01 10*3/mm3      Immature Grans, Absolute 0.03 10*3/mm3      nRBC 0.0 /100 WBC     Fibrinogen [[id number]]  (Abnormal) Collected: 01/12/21 0504    Specimen: Blood Updated: 01/12/21 0634     Fibrinogen 761 mg/dL     Comprehensive Metabolic Panel [[id number]]  (Abnormal) Collected: 01/12/21 0504    Specimen:  Blood Updated: 01/12/21 0630     Glucose 135 mg/dL      BUN 16 mg/dL      Creatinine 1.18 mg/dL      Sodium 134 mmol/L      Potassium 5.2 mmol/L      Chloride 101 mmol/L      CO2 22.5 mmol/L      Calcium 8.1 mg/dL      Total Protein 6.9 g/dL      Albumin 3.20 g/dL      ALT (SGPT) 275 U/L      AST (SGOT) 124 U/L      Alkaline Phosphatase 487 U/L      Total Bilirubin 0.5 mg/dL      eGFR Non African Amer 66 mL/min/1.73      Globulin 3.7 gm/dL      A/G Ratio 0.9 g/dL      BUN/Creatinine Ratio 13.6     Anion Gap 10.5 mmol/L     Narrative:      GFR Normal >60  Chronic Kidney Disease <60  Kidney Failure <15      CK [[id number]]  (Abnormal) Collected: 01/12/21 0504    Specimen: Blood Updated: 01/12/21 0630     Creatine Kinase 274 U/L     Lactate Dehydrogenase [[id number]]  (Abnormal) Collected: 01/12/21 0504    Specimen: Blood Updated: 01/12/21 0630      U/L     Bilirubin, Direct [[id number]]  (Normal) Collected: 01/12/21 0504    Specimen: Blood Updated: 01/12/21 0630     Bilirubin, Direct 0.2 mg/dL     C-reactive Protein [[id number]]  (Abnormal) Collected: 01/12/21 0504    Specimen: Blood Updated: 01/12/21 0630     C-Reactive Protein 12.75 mg/dL     Ferritin [[id number]]  (Abnormal) Collected: 01/11/21 0954    Specimen: Blood Updated: 01/11/21 1531     Ferritin 3,377.00 ng/mL     Narrative:      Results may be falsely decreased if patient taking Biotin.      Lactate Dehydrogenase [[id number]]  (Abnormal) Collected: 01/11/21 0954    Specimen: Blood Updated: 01/11/21 1523      U/L     Procalcitonin [[id number]]  (Abnormal) Collected: 01/11/21 0954    Specimen: Blood Updated: 01/11/21 1510     Procalcitonin 0.52 ng/mL     Narrative:      As a Marker for Sepsis (Non-Neonates):   1. <0.5 ng/mL represents a low risk of severe sepsis and/or septic shock.  1. >2 ng/mL represents a high risk of severe sepsis and/or septic shock.    As a Marker for Lower Respiratory Tract Infections that require antibiotic therapy:  PCT on  "Admission     Antibiotic Therapy             6-12 Hrs later  > 0.5                Strongly Recommended            >0.25 - <0.5         Recommended  0.1 - 0.25           Discouraged                   Remeasure/reassess PCT  <0.1                 Strongly Discouraged          Remeasure/reassess PCT      As 28 day mortality risk marker: \"Change in Procalcitonin Result\" (> 80 % or <=80 %) if Day 0 (or Day 1) and Day 4 values are available. Refer to http://www.[email or web address] [place][email or web address]/   Change in PCT <=80 %   A decrease of PCT levels below or equal to 80 % defines a positive change in PCT test result representing a higher risk for 28-day all-cause mortality of patients diagnosed with severe sepsis or septic shock.  Change in PCT > 80 %   A decrease of PCT levels of more than 80 % defines a negative change in PCT result representing a lower risk for 28-day all-cause mortality of patients diagnosed with severe sepsis or septic shock.                Results may be falsely decreased if patient taking Biotin.     D-dimer, Quantitative [[id number]]  (Abnormal) Collected: 01/11/21 0954    Specimen: Blood Updated: 01/11/21 1448     D-Dimer, Quantitative 1.23 MCGFEU/mL     COVID-19,[facility]-HOU[facility],Nasal Swab No Transport Media 3-4 HR TAT - Swab, Nasal Cavity [[id number]]  (Abnormal) Collected: 01/11/21 1137    Specimen: Swab from Nasal Cavity Updated: 01/11/21 1216     COVID19 Detected    Narrative:      Fact sheet for providers: https://www.fda.gov/media/225448/download     Fact sheet for patients: https://www.fda.gov/media/109740/download    Test performed by PCR.    Blood Culture - Blood, Arm, Left [[id number]] Collected: 01/11/21 1050    Specimen: Blood from Arm, Left Updated: 01/11/21 1113    Blood Culture - Blood, Arm, Right [[id number]] Collected: 01/11/21 1057    Specimen: Blood from Arm, Right Updated: 01/11/21 1112    [place] Draw [[id number]] Collected: 01/11/21 0954    Specimen: Blood Updated: 01/11/21 1101    " Narrative:      The following orders were created for panel order [place] Draw.  Procedure                               Abnormality         Status                     ---------                               -----------         ------                     Light Blue Top[[id number]]                                   Final result               Green Top (Gel)[[id number]]                                  Final result               Lavender Top[[id number]]                                     Final result               Gold Top - SST[[id number]]                                   Final result               Green Top (No Gel)[[id number]]                               In process                   Please view results for these tests on the individual orders.    Light Blue Top [[id number]] Collected: 01/11/21 0954    Specimen: Blood Updated: 01/11/21 1101     Extra Tube hold for add-on     Comment: Auto resulted       Green Top (Gel) [[id number]] Collected: 01/11/21 0954    Specimen: Blood Updated: 01/11/21 1101     Extra Tube Hold for add-ons.     Comment: Auto resulted.       Lavender Top [[id number]] Collected: 01/11/21 0954    Specimen: Blood Updated: 01/11/21 1101     Extra Tube hold for add-on     Comment: Auto resulted       Gold Top - SST [[id number]] Collected: 01/11/21 0954    Specimen: Blood Updated: 01/11/21 1101     Extra Tube Hold for add-ons.     Comment: Auto resulted.       Blood Gas, Arterial With Co-Ox [[id number]]  (Abnormal) Collected: 01/11/21 1036    Specimen: Arterial Blood Updated: 01/11/21 1036     Site Left Radial     T[place]'s Test Positive     pH, Arterial 7.436 pH units      pCO2, Arterial 38.1 mm Hg      pO2, Arterial 65.6 mm Hg      Comment: 84 Value below reference range        HCO3, Arterial 25.6 mmol/L      Base Excess, Arterial 1.4 mmol/L      O2 Saturation, Arterial 93.3 %      Comment: 84 Value below reference range        Hematocrit, Blood Gas 40.0 %      Oxyhemoglobin 91.7 %      Comment: 84 Value  below reference range        Methemoglobin 0.90 %      Carboxyhemoglobin 0.8 %      A-a Gradiant 37.3 mmHg      Barometric Pressure for Blood Gas 742 mmHg      Modality Nasal Cannula     Flow Rate 3.0 lpm      Ventilator Mode NA     Comment: Meter: [id number]     :  [id number]        Note --     pH, Temp Corrected --     pCO2, Temperature Corrected --     pO2, Temperature Corrected --    CBC & Differential [[id number]]  (Abnormal) Collected: 01/11/21 0954    Specimen: Blood Updated: 01/11/21 1034    Narrative:      The following orders were created for panel order CBC & Differential.  Procedure                               Abnormality         Status                     ---------                               -----------         ------                     Scan Slide[[id number]]                                       Final result               CBC Auto Differential[[id number]]        Abnormal            Final result                 Please view results for these tests on the individual orders.    Scan Slide [[id number]] Collected: 01/11/21 0954    Specimen: Blood Updated: 01/11/21 1034     RBC Morphology Normal     WBC Morphology Normal     Platelet Estimate Adequate    CBC Auto Differential [[id number]]  (Abnormal) Collected: 01/11/21 0954    Specimen: Blood Updated: 01/11/21 1034     WBC 5.57 10*3/mm3      RBC 5.97 10*6/mm3      Hemoglobin 13.5 g/dL      Hematocrit 43.5 %      MCV 72.9 fL      MCH 22.6 pg      MCHC 31.0 g/dL      RDW 14.6 %      RDW-SD 38.3 fl      MPV 9.6 fL      Platelets 284 10*3/mm3      Neutrophil % 80.6 %      Lymphocyte % 16.3 %      Monocyte % 2.0 %      Eosinophil % 0.0 %      Basophil % 0.4 %      Immature Grans % 0.7 %      Neutrophils, Absolute 4.49 10*3/mm3      Lymphocytes, Absolute 0.91 10*3/mm3      Monocytes, Absolute 0.11 10*3/mm3      Eosinophils, Absolute 0.00 10*3/mm3      Basophils, Absolute 0.02 10*3/mm3      Immature Grans, Absolute 0.04 10*3/mm3      nRBC 0.0 /100 WBC      BNP [[id number]]  (Normal) Collected: 01/11/21 0954    Specimen: Blood Updated: 01/11/21 1019     proBNP 79.5 pg/mL     Narrative:      Among patients with dyspnea, NT-proBNP is highly sensitive for the detection of acute congestive heart failure. In addition NT-proBNP of <300 pg/ml effectively rules out acute congestive heart failure with 99% negative predictive value.    Results may be falsely decreased if patient taking Biotin.      Troponin [[id number]]  (Normal) Collected: 01/11/21 0954    Specimen: Blood Updated: 01/11/21 1019     Troponin T <0.010 ng/mL     Narrative:      Troponin T Reference Range:  <= 0.03 ng/mL-   Negative for AMI  >0.03 ng/mL-     Abnormal for myocardial necrosis.  Clinicians would have to utilize clinical acumen, EKG, Troponin and serial changes to determine if it is an Acute Myocardial Infarction or myocardial injury due to an underlying chronic condition.       Results may be falsely decreased if patient taking Biotin.      Comprehensive Metabolic Panel [[id number]]  (Abnormal) Collected: 01/11/21 0954    Specimen: Blood Updated: 01/11/21 1016     Glucose 129 mg/dL      BUN 14 mg/dL      Creatinine 1.43 mg/dL      Sodium 135 mmol/L      Potassium 4.5 mmol/L      Chloride 100 mmol/L      CO2 24.6 mmol/L      Calcium 8.5 mg/dL      Total Protein 7.8 g/dL      Albumin 3.60 g/dL      ALT (SGPT) 352 U/L      AST (SGOT) 182 U/L      Alkaline Phosphatase 546 U/L      Total Bilirubin 0.8 mg/dL      eGFR Non African Amer 53 mL/min/1.73      Globulin 4.2 gm/dL      A/G Ratio 0.9 g/dL      BUN/Creatinine Ratio 9.8     Anion Gap 10.4 mmol/L     Narrative:      GFR Normal >60  Chronic Kidney Disease <60  Kidney Failure <15      Green Top (No Gel) [[id number]] Collected: 01/11/21 0954    Specimen: Blood Updated: 01/11/21 0957        Imaging Results (Last 24 Hours)     Procedure Component Value Units Date/Time    CT Chest Pulmonary Embolism [[id number]] Collected: 01/12/21 0729     Updated: 01/12/21  0733    Narrative:      PROCEDURE: CT CHEST PULMONARY EMBOLISM-     HISTORY: PE suspected, low/intermediate prob, positive D-dimer;  U07.1-COVID-19; J12.82-Pneumonia due to Coronavirus disease 2019;  J96.01-Acute respiratory failure with hypoxia; R74.01-Elevation of  levels of liver transaminase levels; N28.9-Disorder of kidney and  ureter, unspecified; E66.9-Obesity, unspecified; Z68.35-Body mass index  (BMI) 35.0-35.9, adult     COMPARISON: None .     TECHNIQUE: Multiple axial CT images were obtained from the thoracic  inlet through the upper abdomen following the administration of Isovue  300 per the CT PE protocol. Coronal and oblique 3D MIP images were  reconstructed from the original axial data set.      FINDINGS: There are no filling defects within the pulmonary arteries to  suggest an embolus. The thoracic aorta is normal in caliber with no  evidence of dissection. The heart is normal in size. There are no  pleural or pericardial effusions. There is no adenopathy. Lung windows  reveal diffuse bilateral groundglass opacities consistent with Covid  pneumonia. Within the visualized upper abdomen there is fatty  infiltration of the liver. The upper abdomen is otherwise unremarkable.  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 Bone windows reveal no acute osseous abnormalities.       Impression:      1. No evidence of pulmonary embolus or dissection.  2. Diffuse bilateral groundglass opacities consistent with Covid  pneumonia.           536.65 mGy.cm        This study was performed with techniques to keep radiation doses as low  as reasonably achievable (ALARA). Individualized dose reduction  techniques using automated exposure control or adjustment of mA and/or  kV according to the patient size were employed.      This report was finalized on 1/12/2021 7:31 AM by Mer Rosales M.D..    XR Chest 1 View [[id number]] Collected: 01/11/21 1047     Updated: 01/11/21 1052     Narrative:      PROCEDURE: XR CHEST 1 VW-     HISTORY: soa, covid positive.     COMPARISON: None.     FINDINGS: The heart is normal in size. The mediastinum is unremarkable.  There has been interval worsening of the patient's bilateral airspace  disease consistent with Covid pneumonia. There is no pneumothorax.   There are no acute osseous abnormalities.       Impression:      Worsening bilateral airspace disease consistent with Covid  pneumonia.     Continued followup is recommended.     This report was finalized on 1/11/2021 10:50 AM by Mer Rosales M.D..          Physician Progress Notes (last 48 hours) (Notes from 01/10/21 0944 through 01/12/21 0944)    No notes of this type exist for this encounter.       Consult Notes (last 48 hours) (Notes from 01/10/21 0944 through 01/12/21 0944)    No notes of this type exist for this encounter.

## 2021-01-12 NOTE — PLAN OF CARE
Goal Outcome Evaluation:  Plan of Care Reviewed With: patient  Progress: declining  Outcome Summary: Pt o2 sats decreased to 85% on 3L, had to be increased to 5L to maintain 90-91%, c/o increased SOA, MD notified and orders recieved, awaiting CT chest.

## 2021-01-13 LAB
ALBUMIN SERPL-MCNC: 3.3 G/DL (ref 3.5–5.2)
ALBUMIN/GLOB SERPL: 0.8 G/DL
ALP SERPL-CCNC: 451 U/L (ref 39–117)
ALT SERPL W P-5'-P-CCNC: 236 U/L (ref 1–41)
ANION GAP SERPL CALCULATED.3IONS-SCNC: 10.3 MMOL/L (ref 5–15)
AST SERPL-CCNC: 88 U/L (ref 1–40)
BASOPHILS # BLD AUTO: 0.02 10*3/MM3 (ref 0–0.2)
BASOPHILS NFR BLD AUTO: 0.4 % (ref 0–1.5)
BILIRUB CONJ SERPL-MCNC: 0.2 MG/DL (ref 0–0.3)
BILIRUB SERPL-MCNC: 0.4 MG/DL (ref 0–1.2)
BUN SERPL-MCNC: 21 MG/DL (ref 6–20)
BUN/CREAT SERPL: 18.4 (ref 7–25)
CALCIUM SPEC-SCNC: 8.7 MG/DL (ref 8.6–10.5)
CHLORIDE SERPL-SCNC: 99 MMOL/L (ref 98–107)
CK SERPL-CCNC: 214 U/L (ref 20–200)
CO2 SERPL-SCNC: 23.7 MMOL/L (ref 22–29)
CREAT SERPL-MCNC: 1.14 MG/DL (ref 0.76–1.27)
CRP SERPL-MCNC: 6.09 MG/DL (ref 0–0.5)
DEPRECATED RDW RBC AUTO: 37.8 FL (ref 37–54)
EOSINOPHIL # BLD AUTO: 0 10*3/MM3 (ref 0–0.4)
EOSINOPHIL NFR BLD AUTO: 0 % (ref 0.3–6.2)
ERYTHROCYTE [DISTWIDTH] IN BLOOD BY AUTOMATED COUNT: 14.5 % (ref 12.3–15.4)
FERRITIN SERPL-MCNC: 2444 NG/ML (ref 30–400)
GFR SERPL CREATININE-BSD FRML MDRD: 69 ML/MIN/1.73
GLOBULIN UR ELPH-MCNC: 3.9 GM/DL
GLUCOSE SERPL-MCNC: 122 MG/DL (ref 65–99)
HCT VFR BLD AUTO: 39.9 % (ref 37.5–51)
HGB BLD-MCNC: 12.4 G/DL (ref 13–17.7)
HYPOCHROMIA BLD QL: NORMAL
IMM GRANULOCYTES # BLD AUTO: 0.08 10*3/MM3 (ref 0–0.05)
IMM GRANULOCYTES NFR BLD AUTO: 1.4 % (ref 0–0.5)
LYMPHOCYTES # BLD AUTO: 1.52 10*3/MM3 (ref 0.7–3.1)
LYMPHOCYTES NFR BLD AUTO: 27.3 % (ref 19.6–45.3)
MCH RBC QN AUTO: 22.5 PG (ref 26.6–33)
MCHC RBC AUTO-ENTMCNC: 31.1 G/DL (ref 31.5–35.7)
MCV RBC AUTO: 72.5 FL (ref 79–97)
MICROCYTES BLD QL: NORMAL
MONOCYTES # BLD AUTO: 0.41 10*3/MM3 (ref 0.1–0.9)
MONOCYTES NFR BLD AUTO: 7.4 % (ref 5–12)
NEUTROPHILS NFR BLD AUTO: 3.54 10*3/MM3 (ref 1.7–7)
NEUTROPHILS NFR BLD AUTO: 63.5 % (ref 42.7–76)
NRBC BLD AUTO-RTO: 0 /100 WBC (ref 0–0.2)
PLATELET # BLD AUTO: 373 10*3/MM3 (ref 140–450)
PMV BLD AUTO: 9.9 FL (ref 6–12)
POTASSIUM SERPL-SCNC: 5.3 MMOL/L (ref 3.5–5.2)
PROT SERPL-MCNC: 7.2 G/DL (ref 6–8.5)
RBC # BLD AUTO: 5.5 10*6/MM3 (ref 4.14–5.8)
SMALL PLATELETS BLD QL SMEAR: ADEQUATE
SODIUM SERPL-SCNC: 133 MMOL/L (ref 136–145)
WBC # BLD AUTO: 5.57 10*3/MM3 (ref 3.4–10.8)
WBC MORPH BLD: NORMAL

## 2021-01-13 PROCEDURE — 82728 ASSAY OF FERRITIN: CPT | Performed by: NURSE PRACTITIONER

## 2021-01-13 PROCEDURE — 63710000001 DEXAMETHASONE PER 0.25 MG: Performed by: NURSE PRACTITIONER

## 2021-01-13 PROCEDURE — 99232 SBSQ HOSP IP/OBS MODERATE 35: CPT | Performed by: NURSE PRACTITIONER

## 2021-01-13 PROCEDURE — 25010000002 ENOXAPARIN PER 10 MG: Performed by: NURSE PRACTITIONER

## 2021-01-13 PROCEDURE — 25010000002 CEFTRIAXONE SODIUM-DEXTROSE 1-3.74 GM-%(50ML) RECONSTITUTED SOLUTION: Performed by: NURSE PRACTITIONER

## 2021-01-13 PROCEDURE — 86140 C-REACTIVE PROTEIN: CPT | Performed by: NURSE PRACTITIONER

## 2021-01-13 PROCEDURE — 85007 BL SMEAR W/DIFF WBC COUNT: CPT | Performed by: NURSE PRACTITIONER

## 2021-01-13 PROCEDURE — 82550 ASSAY OF CK (CPK): CPT | Performed by: NURSE PRACTITIONER

## 2021-01-13 PROCEDURE — 82248 BILIRUBIN DIRECT: CPT | Performed by: NURSE PRACTITIONER

## 2021-01-13 PROCEDURE — 85025 COMPLETE CBC W/AUTO DIFF WBC: CPT | Performed by: NURSE PRACTITIONER

## 2021-01-13 PROCEDURE — 25010000002 AZITHROMYCIN 500 MG/250 ML: Performed by: NURSE PRACTITIONER

## 2021-01-13 PROCEDURE — 80053 COMPREHEN METABOLIC PANEL: CPT | Performed by: NURSE PRACTITIONER

## 2021-01-13 RX ORDER — DEXAMETHASONE 4 MG/1
6 TABLET ORAL
Status: DISCONTINUED | OUTPATIENT
Start: 2021-01-13 | End: 2021-01-14 | Stop reason: HOSPADM

## 2021-01-13 RX ORDER — DEXAMETHASONE SODIUM PHOSPHATE 4 MG/ML
6 INJECTION, SOLUTION INTRA-ARTICULAR; INTRALESIONAL; INTRAMUSCULAR; INTRAVENOUS; SOFT TISSUE
Status: DISCONTINUED | OUTPATIENT
Start: 2021-01-13 | End: 2021-01-14 | Stop reason: HOSPADM

## 2021-01-13 RX ADMIN — CEFTRIAXONE 1 G: 1 INJECTION, SOLUTION INTRAVENOUS at 10:46

## 2021-01-13 RX ADMIN — SODIUM CHLORIDE, PRESERVATIVE FREE 10 ML: 5 INJECTION INTRAVENOUS at 08:16

## 2021-01-13 RX ADMIN — SODIUM CHLORIDE, PRESERVATIVE FREE 10 ML: 5 INJECTION INTRAVENOUS at 20:47

## 2021-01-13 RX ADMIN — AZITHROMYCIN 500 MG: 500 INJECTION, POWDER, LYOPHILIZED, FOR SOLUTION INTRAVENOUS at 12:06

## 2021-01-13 RX ADMIN — Medication 5 MG: at 20:48

## 2021-01-13 RX ADMIN — REMDESIVIR 100 MG: 100 INJECTION, POWDER, LYOPHILIZED, FOR SOLUTION INTRAVENOUS at 17:09

## 2021-01-13 RX ADMIN — DEXAMETHASONE 6 MG: 4 TABLET ORAL at 08:16

## 2021-01-13 RX ADMIN — ENOXAPARIN SODIUM 60 MG: 60 INJECTION SUBCUTANEOUS at 17:10

## 2021-01-13 NOTE — PLAN OF CARE
Goal Outcome Evaluation:  Plan of Care Reviewed With: patient  Progress: improving  Outcome Summary: vital signs stable no acute events overnight will continue to monitor

## 2021-01-13 NOTE — PROGRESS NOTES
[facility]    Name:  Kraig Stafford   Age:  45 y.o.  Sex:  male  :  1975  MRN:  [id number]   Visit Number:  [id number]  Admission Date:  2021  Date Of Service:  21  Primary Care Physician:  Provider, No Known     LOS: 2 days :  Patient Care Team:  Provider, No Known as PCP - General:    Chief Complaint:      Shortness of Breath/ Covid 19 pneumonia    Subjective / Interval History:     Patient seen and evaluated today.  Resting in recliner on evaluation.  Patient is currently on 2L, however, nursing staff reports that he has been resting without any oxygen support at times, too.  Patient states he has not been up moving around much, but is feeling better.  Discussed course of care with patient today.  Patient with no complaints on evaluation.    Review of Systems:     General ROS: Patient denies any fevers, chills or loss of consciousness.  Respiratory ROS: + cough or shortness of breath.  Cardiovascular ROS: Denies chest pain or palpitations. No history of exertional chest pain.  Gastrointestinal ROS: Denies nausea and vomiting. Denies any abdominal pain. No diarrhea.  Neurological ROS: Denies any focal weakness. No loss of consciousness. Denies any numbness.  Dermatological ROS: Denies any redness or pruritis.    Vital Signs:    Temp:  [96.9 °F (36.1 °C)-98.1 °F (36.7 °C)] 97.9 °F (36.6 °C)  Heart Rate:  [61-78] 61  Resp:  [20-24] 22  BP: (131-141)/(77-94) 136/84    Intake and output:    I/O last 3 completed shifts:  In:  [P.O.:1080; I.V.:1056]  Out: 3725 [Urine:3725]  No intake/output data recorded.    Physical Examination:    General Appearance:  Alert and cooperative, not in any acute distress, resting in recliner without any complaints on examination with 2L in place.   Head:  Atraumatic and normocephalic, without obvious abnormality.   Eyes:          Conjunctivae and sclerae normal, no Icterus. No pallor. Extraocular movements are  within normal limits.   Neck: Supple, trachea midline   Lungs:   Chest shape is normal. Breath sounds heard bilaterally equally.  BL crackles to bases, no wheezing. No pleural rub or bronchial breathing.   Heart:  RRR, no murmur, no gallop, no rub. No JVD   Abdomen:   Normal bowel sounds, no masses, no organomegaly. Soft, nontender, nondistended, no guarding, no rebound tenderness.   Extremities: Moves all extremities, no edema, no cyanosis, no clubbing.   Skin: No bleeding, bruising or rash.   Neurologic: Awake, alert and oriented times 3. Moves all 4 extremities equally.     Laboratory results:    Results from last 7 days   Lab Units 01/13/21  0718 01/12/21  0504 01/11/21  0954   SODIUM mmol/L 133* 134* 135*   POTASSIUM mmol/L 5.3* 5.2 4.5   CHLORIDE mmol/L 99 101 100   CO2 mmol/L 23.7 22.5 24.6   BUN mg/dL 21* 16 14   CREATININE mg/dL 1.14 1.18 1.43*   CALCIUM mg/dL 8.7 8.1* 8.5*   BILIRUBIN mg/dL 0.4 0.5 0.8   ALK PHOS U/L 451* 487* 546*   ALT (SGPT) U/L 236* 275* 352*   AST (SGOT) U/L 88* 124* 182*   GLUCOSE mg/dL 122* 135* 129*     Results from last 7 days   Lab Units 01/13/21  0718 01/12/21  0504 01/11/21  0954   WBC 10*3/mm3 5.57 3.76 5.57   HEMOGLOBIN g/dL 12.4* 11.6* 13.5   HEMATOCRIT % 39.9 37.8 43.5   PLATELETS 10*3/mm3 373 330 284         Results from last 7 days   Lab Units 01/13/21  0718 01/12/21  0504 01/11/21  0954   CK TOTAL U/L 214* 274*  --    TROPONIN T ng/mL  --   --  <0.010     Results from last 7 days   Lab Units 01/11/21  1057 01/11/21  1050   BLOODCX  No growth at 2 days No growth at 2 days     Results from last 7 days   Lab Units 01/11/21  1036   PH, ARTERIAL pH units 7.436   PO2 ART mm Hg 65.6*   PCO2, ARTERIAL mm Hg 38.1   HCO3 ART mmol/L 25.6       I have reviewed the patient's laboratory results.    Radiology results:    Imaging Results (Last 24 Hours)     ** No results found for the last 24 hours. **          I have reviewed the patient's radiology reports.    Medication Review:     I  have reviewed the patient's active and prn medications.     Problem List:      Pneumonia due to COVID-19 virus      Assessment:    Acute respiratory failure with hypoxia  Pneumonia due to Covid-19   Transaminitis  Mild Renal Insufficiency  Obesity    Plan:    Completing antibiotic therapy today, and finishing Remdesivir tomorrow.  Patient continues to improve requiring 2-3L of oxygen.  Will continue Decadron therapy. Blood cultures with no growth.  Lab work is stable.  Will order walking oximetry for tomorrow and potentially discharge home.  Continue supportive care, monitoring lab work, vital signs, pain and oxygen demands.    Terri Anaya, APRN  01/13/21  14:26 EST    Dictated utilizing Dragon dictation.

## 2021-01-13 NOTE — PLAN OF CARE
Goal Outcome Evaluation:  Plan of Care Reviewed With: patient  Progress: improving   Patient titrated down to 1L 02 NC, maintaining in 90s on 1L (desats with any activity thought), up in chair today, independent, IV abx given, will continue to monitor and notify MD for any issues or concerns

## 2021-01-14 ENCOUNTER — READMISSION MANAGEMENT (OUTPATIENT)
Dept: CALL CENTER | Facility: HOSPITAL | Age: 46
End: 2021-01-14

## 2021-01-14 VITALS
WEIGHT: 281.97 LBS | HEART RATE: 82 BPM | DIASTOLIC BLOOD PRESSURE: 82 MMHG | SYSTOLIC BLOOD PRESSURE: 129 MMHG | BODY MASS INDEX: 35.06 KG/M2 | TEMPERATURE: 96.2 F | OXYGEN SATURATION: 90 % | HEIGHT: 75 IN | RESPIRATION RATE: 20 BRPM

## 2021-01-14 LAB
ALBUMIN SERPL-MCNC: 3.3 G/DL (ref 3.5–5.2)
ALBUMIN/GLOB SERPL: 0.8 G/DL
ALP SERPL-CCNC: 441 U/L (ref 39–117)
ALT SERPL W P-5'-P-CCNC: 271 U/L (ref 1–41)
ANION GAP SERPL CALCULATED.3IONS-SCNC: 11 MMOL/L (ref 5–15)
ANISOCYTOSIS BLD QL: NORMAL
AST SERPL-CCNC: 101 U/L (ref 1–40)
BASOPHILS # BLD AUTO: 0.01 10*3/MM3 (ref 0–0.2)
BASOPHILS NFR BLD AUTO: 0.1 % (ref 0–1.5)
BILIRUB CONJ SERPL-MCNC: 0.2 MG/DL (ref 0–0.3)
BILIRUB SERPL-MCNC: 0.5 MG/DL (ref 0–1.2)
BUN SERPL-MCNC: 23 MG/DL (ref 6–20)
BUN/CREAT SERPL: 21.3 (ref 7–25)
CALCIUM SPEC-SCNC: 8.8 MG/DL (ref 8.6–10.5)
CHLORIDE SERPL-SCNC: 98 MMOL/L (ref 98–107)
CK SERPL-CCNC: 178 U/L (ref 20–200)
CO2 SERPL-SCNC: 24 MMOL/L (ref 22–29)
CREAT SERPL-MCNC: 1.08 MG/DL (ref 0.76–1.27)
CRP SERPL-MCNC: 3.59 MG/DL (ref 0–0.5)
D DIMER PPP FEU-MCNC: 0.71 MCGFEU/ML (ref 0–0.57)
DEPRECATED RDW RBC AUTO: 36.3 FL (ref 37–54)
ELLIPTOCYTES BLD QL SMEAR: NORMAL
EOSINOPHIL # BLD AUTO: 0 10*3/MM3 (ref 0–0.4)
EOSINOPHIL NFR BLD AUTO: 0 % (ref 0.3–6.2)
ERYTHROCYTE [DISTWIDTH] IN BLOOD BY AUTOMATED COUNT: 14.3 % (ref 12.3–15.4)
FERRITIN SERPL-MCNC: 1866 NG/ML (ref 30–400)
FIBRINOGEN PPP-MCNC: 616 MG/DL (ref 209–518)
GFR SERPL CREATININE-BSD FRML MDRD: 74 ML/MIN/1.73
GLOBULIN UR ELPH-MCNC: 3.9 GM/DL
GLUCOSE SERPL-MCNC: 125 MG/DL (ref 65–99)
HCT VFR BLD AUTO: 40.6 % (ref 37.5–51)
HGB BLD-MCNC: 12.9 G/DL (ref 13–17.7)
IMM GRANULOCYTES # BLD AUTO: 0.2 10*3/MM3 (ref 0–0.05)
IMM GRANULOCYTES NFR BLD AUTO: 2.6 % (ref 0–0.5)
LARGE PLATELETS: NORMAL
LDH SERPL-CCNC: 375 U/L (ref 135–225)
LYMPHOCYTES # BLD AUTO: 1.91 10*3/MM3 (ref 0.7–3.1)
LYMPHOCYTES NFR BLD AUTO: 24.7 % (ref 19.6–45.3)
MCH RBC QN AUTO: 22.8 PG (ref 26.6–33)
MCHC RBC AUTO-ENTMCNC: 31.8 G/DL (ref 31.5–35.7)
MCV RBC AUTO: 71.9 FL (ref 79–97)
MICROCYTES BLD QL: NORMAL
MONOCYTES # BLD AUTO: 0.57 10*3/MM3 (ref 0.1–0.9)
MONOCYTES NFR BLD AUTO: 7.4 % (ref 5–12)
NEUTROPHILS NFR BLD AUTO: 5.03 10*3/MM3 (ref 1.7–7)
NEUTROPHILS NFR BLD AUTO: 65.2 % (ref 42.7–76)
NRBC BLD AUTO-RTO: 0.3 /100 WBC (ref 0–0.2)
PLATELET # BLD AUTO: 400 10*3/MM3 (ref 140–450)
PMV BLD AUTO: 9.5 FL (ref 6–12)
POIKILOCYTOSIS BLD QL SMEAR: NORMAL
POTASSIUM SERPL-SCNC: 5.3 MMOL/L (ref 3.5–5.2)
PROT SERPL-MCNC: 7.2 G/DL (ref 6–8.5)
RBC # BLD AUTO: 5.65 10*6/MM3 (ref 4.14–5.8)
SMALL PLATELETS BLD QL SMEAR: ADEQUATE
SODIUM SERPL-SCNC: 133 MMOL/L (ref 136–145)
WBC # BLD AUTO: 7.72 10*3/MM3 (ref 3.4–10.8)
WBC MORPH BLD: NORMAL

## 2021-01-14 PROCEDURE — 25010000002 INFLUENZA VAC SPLIT QUAD 0.5 ML SUSPENSION PREFILLED SYRINGE: Performed by: INTERNAL MEDICINE

## 2021-01-14 PROCEDURE — 99238 HOSP IP/OBS DSCHRG MGMT 30/<: CPT | Performed by: NURSE PRACTITIONER

## 2021-01-14 PROCEDURE — 82550 ASSAY OF CK (CPK): CPT | Performed by: NURSE PRACTITIONER

## 2021-01-14 PROCEDURE — 85007 BL SMEAR W/DIFF WBC COUNT: CPT | Performed by: NURSE PRACTITIONER

## 2021-01-14 PROCEDURE — 85384 FIBRINOGEN ACTIVITY: CPT | Performed by: NURSE PRACTITIONER

## 2021-01-14 PROCEDURE — 86140 C-REACTIVE PROTEIN: CPT | Performed by: NURSE PRACTITIONER

## 2021-01-14 PROCEDURE — 63710000001 DEXAMETHASONE PER 0.25 MG: Performed by: NURSE PRACTITIONER

## 2021-01-14 PROCEDURE — 82728 ASSAY OF FERRITIN: CPT | Performed by: NURSE PRACTITIONER

## 2021-01-14 PROCEDURE — 80053 COMPREHEN METABOLIC PANEL: CPT | Performed by: NURSE PRACTITIONER

## 2021-01-14 PROCEDURE — 90686 IIV4 VACC NO PRSV 0.5 ML IM: CPT | Performed by: INTERNAL MEDICINE

## 2021-01-14 PROCEDURE — G0008 ADMIN INFLUENZA VIRUS VAC: HCPCS | Performed by: INTERNAL MEDICINE

## 2021-01-14 PROCEDURE — 85379 FIBRIN DEGRADATION QUANT: CPT | Performed by: NURSE PRACTITIONER

## 2021-01-14 PROCEDURE — 83615 LACTATE (LD) (LDH) ENZYME: CPT | Performed by: NURSE PRACTITIONER

## 2021-01-14 PROCEDURE — 82248 BILIRUBIN DIRECT: CPT | Performed by: NURSE PRACTITIONER

## 2021-01-14 PROCEDURE — 85025 COMPLETE CBC W/AUTO DIFF WBC: CPT | Performed by: NURSE PRACTITIONER

## 2021-01-14 RX ORDER — ALBUTEROL SULFATE 90 UG/1
2 AEROSOL, METERED RESPIRATORY (INHALATION) EVERY 6 HOURS PRN
Qty: 8.5 G | Refills: 0 | Status: SHIPPED | OUTPATIENT
Start: 2021-01-14 | End: 2021-03-18

## 2021-01-14 RX ORDER — DEXAMETHASONE 6 MG/1
6 TABLET ORAL
Qty: 6 TABLET | Refills: 0 | Status: SHIPPED | OUTPATIENT
Start: 2021-01-15 | End: 2021-01-21

## 2021-01-14 RX ORDER — BENZONATATE 100 MG/1
100 CAPSULE ORAL 3 TIMES DAILY PRN
Qty: 30 CAPSULE | Refills: 0 | Status: SHIPPED | OUTPATIENT
Start: 2021-01-14 | End: 2021-03-18

## 2021-01-14 RX ADMIN — ALBUTEROL SULFATE 2 PUFF: 90 AEROSOL, METERED RESPIRATORY (INHALATION) at 08:30

## 2021-01-14 RX ADMIN — SODIUM CHLORIDE, PRESERVATIVE FREE 10 ML: 5 INJECTION INTRAVENOUS at 08:30

## 2021-01-14 RX ADMIN — DEXAMETHASONE 6 MG: 4 TABLET ORAL at 08:30

## 2021-01-14 RX ADMIN — INFLUENZA VIRUS VACCINE 0.5 ML: 15; 15; 15; 15 SUSPENSION INTRAMUSCULAR at 15:02

## 2021-01-14 RX ADMIN — REMDESIVIR 100 MG: 100 INJECTION, POWDER, LYOPHILIZED, FOR SOLUTION INTRAVENOUS at 15:02

## 2021-01-14 NOTE — DISCHARGE SUMMARY
[facility]   DISCHARGE SUMMARY      Name:  Kraig Stafford   Age:  45 y.o.  Sex:  male  :  1975  MRN:  [id number]   Visit Number:  [id number]    Admission Date:  2021  Date of Discharge:  2021  Primary Care Physician:  Provider, No Known    Important Issues to Note:    1.  Diagnosed with Covid 19 pneumonia  2.  Transaminitis noted on admission--liver enzymes will need to be rechecked by PCP after discharge  3.  Patient did not require any supplemental oxygen at discharge.  4.  Received Remdesivir and Decadron therapy for Covid 19 infection.  5. Sent home with pulse oximeter.        Discharge Diagnoses:       Pneumonia due to COVID-19 virus      Presenting Problem:    Pneumonia due to COVID-19 virus [U07.1, J12.82]     Consults:     Consults     No orders found from 2020 to 2021.        Consulting Physician(s)             None               History of presenting illness/ Hospital Course:    Patient is 45 year old  male that presented to the Emergency Department today after being diagnosed with Covid 19 6 days ago with worsening symptoms.  Patient complained of worsening shortness of breath and states that his pulse ox at home was reading in the mid 70s with ambulation. Room air saturations on arrival were 87%.  Oxygen at 3L improved to 92%. Patient with no significant health history and states he does not really go to the doctor.  He does not have a PCP.  Workup in the ED revealed CXR with worsening BL infiltrates compared to 3 days ago.  Renal insufficiency noted with Creatinine-1.43 compared to 1.34 on prior visit 3 days ago.  Transaminitis stable compared to 3 days prior with ALK Phos-546, AST-182, and ALT-352. ABG stable in the ED.  Vital signs stable on admission.     During patient's admission he did have a CT to rule out PE after having elevated d-dimer.  CT was negative for PE.  Blood cultures with no growth.  Patient received 3  doses of Azithromycin and Rocephin for possible superimposed bacterial pneumonia.  Patient received 4 doses of Decadron and will be discharged to complete the remaining 6 days.  Patient received 4 doses of Remdesivir while admitted.  Clinically, the patient had marked improvement.  Patient requiring only PRN supplemental oxygen today and states he would like to go home.  Walking oximetry revealed no need for supplemental oxygen at discharge.  Patient will need to be followed outpatient and establish care with a PCP.  Appointment being made prior to discharge.    PCP will need to recheck labs outpatient as patient was noted to have Transaminitis on admission which is not uncommon for Covid 19 infection.        Vital Signs:    Temp:  [96 °F (35.6 °C)-97.9 °F (36.6 °C)] 96 °F (35.6 °C)  Heart Rate:  [66-82] 82  Resp:  [20] 20  BP: (136-144)/(84-88) 137/87    Physical Exam:    General Appearance:  Alert and cooperative, not in any acute distress, resting in bed without any complaints on room air   Head:  Atraumatic and normocephalic, without obvious abnormality.   Eyes:          PERRLA, conjunctivae and sclerae normal, no icterus. No pallor. Extraocular movements are within normal limits.   Ears:  Ears appear intact with no abnormalities noted.   Throat: No oral lesions, no thrush, oral mucosa moist.   Neck: Supple, trachea midline   Back:   No kyphoscoliosis present. No tenderness to palpation,   range of motion normal.   Lungs:   Chest shape is normal. Breath sounds heard bilaterally equally.  BL crackles to bases without wheezing. No Pleural rub or bronchial breathing.   Heart:  RRR, no murmur, no gallop, no rub. No JVD.   Abdomen:   Normal bowel sounds, no masses, no organomegaly. Soft, nontender, nondistended, no guarding, no rebound tenderness.   Extremities: Moves all extremities, no edema, no cyanosis, no clubbing.   Pulses: Pulses palpable and equal bilaterally.   Skin: No bleeding, bruising or rash.    Neurologic: Alert and oriented x 3. Moves all four limbs equally. No tremors. No facial asymmetry.     Pertinent Lab Results:     Results from last 7 days   Lab Units 01/14/21  0605 01/13/21  0718 01/12/21  0504   SODIUM mmol/L 133* 133* 134*   POTASSIUM mmol/L 5.3* 5.3* 5.2   CHLORIDE mmol/L 98 99 101   CO2 mmol/L 24.0 23.7 22.5   BUN mg/dL 23* 21* 16   CREATININE mg/dL 1.08 1.14 1.18   CALCIUM mg/dL 8.8 8.7 8.1*   BILIRUBIN mg/dL 0.5 0.4 0.5   ALK PHOS U/L 441* 451* 487*   ALT (SGPT) U/L 271* 236* 275*   AST (SGOT) U/L 101* 88* 124*   GLUCOSE mg/dL 125* 122* 135*     Results from last 7 days   Lab Units 01/14/21  0605 01/13/21  0718 01/12/21  0504   WBC 10*3/mm3 7.72 5.57 3.76   HEMOGLOBIN g/dL 12.9* 12.4* 11.6*   HEMATOCRIT % 40.6 39.9 37.8   PLATELETS 10*3/mm3 400 373 330         Results from last 7 days   Lab Units 01/14/21  0605 01/13/21  0718 01/12/21  0504 01/11/21  0954   CK TOTAL U/L 178 214* 274*  --    TROPONIN T ng/mL  --   --   --  <0.010     Results from last 7 days   Lab Units 01/11/21  0954   PROBNP pg/mL 79.5             Results from last 7 days   Lab Units 01/11/21  1036   PH, ARTERIAL pH units 7.436   PO2 ART mm Hg 65.6*   PCO2, ARTERIAL mm Hg 38.1   HCO3 ART mmol/L 25.6           Invalid input(s): USDES,  BLOODU, NITRITITE, BACT, EP  Pain Management Panel     There is no flowsheet data to display.        Results from last 7 days   Lab Units 01/11/21  1057 01/11/21  1050   BLOODCX  No growth at 3 days No growth at 3 days       Pertinent Radiology Results:    Imaging Results (All)     Procedure Component Value Units Date/Time    CT Chest Pulmonary Embolism [[id number]] Collected: 01/12/21 0729     Updated: 01/12/21 0733    Narrative:      PROCEDURE: CT CHEST PULMONARY EMBOLISM-     HISTORY: PE suspected, low/intermediate prob, positive D-dimer;  U07.1-COVID-19; J12.82-Pneumonia due to Coronavirus disease 2019;  J96.01-Acute respiratory failure with hypoxia; R74.01-Elevation of  levels of liver  transaminase levels; N28.9-Disorder of kidney and  ureter, unspecified; E66.9-Obesity, unspecified; Z68.35-Body mass index  (BMI) 35.0-35.9, adult     COMPARISON: None .     TECHNIQUE: Multiple axial CT images were obtained from the thoracic  inlet through the upper abdomen following the administration of Isovue  300 per the CT PE protocol. Coronal and oblique 3D MIP images were  reconstructed from the original axial data set.      FINDINGS: There are no filling defects within the pulmonary arteries to  suggest an embolus. The thoracic aorta is normal in caliber with no  evidence of dissection. The heart is normal in size. There are no  pleural or pericardial effusions. There is no adenopathy. Lung windows  reveal diffuse bilateral groundglass opacities consistent with Covid  pneumonia. Within the visualized upper abdomen there is fatty  infiltration of the liver. The upper abdomen is otherwise unremarkable.  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 Bone windows reveal no acute osseous abnormalities.       Impression:      1. No evidence of pulmonary embolus or dissection.  2. Diffuse bilateral groundglass opacities consistent with Covid  pneumonia.           536.65 mGy.cm        This study was performed with techniques to keep radiation doses as low  as reasonably achievable (ALARA). Individualized dose reduction  techniques using automated exposure control or adjustment of mA and/or  kV according to the patient size were employed.      This report was finalized on 1/12/2021 7:31 AM by Mer Rosales M.D..    XR Chest 1 View [[id number]] Collected: 01/11/21 1047     Updated: 01/11/21 1052    Narrative:      PROCEDURE: XR CHEST 1 VW-     HISTORY: soa, covid positive.     COMPARISON: None.     FINDINGS: The heart is normal in size. The mediastinum is unremarkable.  There has been interval worsening of the patient's bilateral airspace  disease consistent with Covid  pneumonia. There is no pneumothorax.   There are no acute osseous abnormalities.       Impression:      Worsening bilateral airspace disease consistent with Covid  pneumonia.     Continued followup is recommended.     This report was finalized on 1/11/2021 10:50 AM by Mer Rosales M.D..             Condition on Discharge:      Stable.    Code status during the hospital stay:    Code Status and Medical Interventions:   Ordered at: 01/11/21 1219     Level Of Support Discussed With:    Patient     Code Status:    CPR     Medical Interventions (Level of Support Prior to Arrest):    Full       Discharge Disposition:    Home or Self Care    Discharge Medications:       Discharge Medications      New Medications      Instructions Start Date   albuterol sulfate  (90 Base) MCG/ACT inhaler  Commonly known as: PROVENTIL HFA;VENTOLIN HFA;PROAIR HFA   2 puffs, Inhalation, Every 6 Hours PRN      benzonatate 100 MG capsule  Commonly known as: TESSALON   100 mg, Oral, 3 Times Daily PRN      dexamethasone 6 MG tablet  Commonly known as: DECADRON   Take 1 tablet by mouth Daily With Breakfast for 6 days.   Start Date: January 15, 2021            Discharge Diet:     Diet Instructions     Advance Diet As Tolerated            Activity at Discharge:     Activity Instructions     Activity as Tolerated            Follow-up Appointments:    Follow-up Information     Lucita King DO Follow up on 1/21/2021.    Specialty: Family Medicine  Why: @ 1:45 PM  Contact information:  [address] [place] [address] [phone]                   Future Appointments   Date Time Provider Department Center   1/21/2021  1:45 PM Lucita King DO [facility]           Test Results Pending at Discharge:    Pending Labs     Order Current Status    Green Top (No Gel) In process    [place] Draw In process    Blood Culture - Blood, Arm, Left Preliminary result    Blood Culture - Blood, Arm, Right Preliminary  result             Terri JEN Anaya, APRN  01/14/21  15:09 EST    Time: I spent 28 minutes on this discharge activity which included: face-to-face encounter with the patient, reviewing the data in the system, coordination of the care with the nursing staff as well as consultants, documentation, and entering orders.     Dictated utilizing Dragon dictation.

## 2021-01-14 NOTE — PLAN OF CARE
Goal Outcome Evaluation:  Plan of Care Reviewed With: patient  Progress: improving  Outcome Summary: VSS this shift.  Pt resting well.  Tolerating oxygen at 1 lpm/nc this shift.  Telemetry monitoring continued.

## 2021-01-14 NOTE — PROGRESS NOTES
Continued Stay Note  [facility]     Patient Name: Kraig Stafford  MRN: [id number]  Today's Date: 1/14/2021    Admit Date: 1/11/2021    Discharge Plan     Row Name 01/14/21 0848       Plan    Plan  plan is to return home    Per 6 min walk does not need oxygen     1225 gave nurse Sari the pulse ox to give to pt         Discharge Codes    No documentation.       Expected Discharge Date and Time     Expected Discharge Date Expected Discharge Time    Jan 14, 2021             Hattie Concepcion RN

## 2021-01-14 NOTE — PROGRESS NOTES
Exercise Oximetry    Patient Name:Kraig Stafford   MRN: [id number]   Date: 01/14/21             ROOM AIR BASELINE   SpO2%94   Heart Rate 65   Blood Pressure      EXERCISE ON ROOM AIR SpO2% EXERCISE ON O2 @  LPM SpO2%   1 MINUTE 92 1 MINUTE    2 MINUTES 92 2 MINUTES    3 MINUTES 91 3 MINUTES    4 MINUTES 91 4 MINUTES    5 MINUTES 89 5 MINUTES    6 MINUTES 89 6 MINUTES               Distance Walked  100 Distance Walked   Dyspnea (Keli Scale)  6 Dyspnea (Keli Scale)   Fatigue (Keli Scale)  2 Fatigue (Keli Scale)   SpO2% Post Exercise  90 SpO2% Post Exercise   HR Post Exercise  79 HR Post Exercise   Time to Recovery  2 Time to Recovery     Comments: pt walked in room on RA, sat stayed 89% and above.

## 2021-01-14 NOTE — DISCHARGE INSTRUCTIONS
You are being discharged home after being treated for Covid 19 Pneumonia.  You will not need any supplemental oxygen at discharge.  We have set up an appointment for you to establish care with a new primary care doctor--please follow up.  You are being discharged home with a pulse oximeter to check your oxygen levels if you feel like they are low.  Finish your Decadron (steroid) therapy as directed and use inhaler if needed for Shortness of Breath or Wheezing.  Return to the hospital with any worsening shortness of breath, chest pains, or worsening current symptoms.  You will need to quarantine for an additional 1-2 weeks to ensure not exposing anyone else to Covid 19.  You also had elevated liver enzymes on admission which is not uncommon with Covid 19 infections.  You will need to have your PCP recheck lab work to make sure they return to normal range.

## 2021-01-15 ENCOUNTER — TRANSITIONAL CARE MANAGEMENT TELEPHONE ENCOUNTER (OUTPATIENT)
Dept: CALL CENTER | Facility: HOSPITAL | Age: 46
End: 2021-01-15

## 2021-01-15 NOTE — OUTREACH NOTE
Call Center TCM Note      Responses   [facility] [place] patient discharged from?  [facility]   Does the patient have one of the following disease processes/diagnoses(primary or secondary)?  COVID-19   COVID-19 underlying condition?  None   TCM attempt successful?  Yes   Call start time  1019   Call end time  1023   Discharge diagnosis  Covid 19 pneumonia   Meds reviewed with patient/caregiver?  Yes   Is the patient having any side effects they believe may be caused by any medication additions or changes?  No   Does the patient have all medications ordered at discharge?  Yes   Is the patient taking all medications as directed (includes completed medication regime)?  Yes   Does the patient have a primary care provider?   Yes   Comments regarding PCP  mychart video visit hospital f/u nisha with Dr. King on 1/21/21   Does the patient have an appointment with their PCP or specialist within 7 days of discharge?  Yes   Has the patient kept scheduled appointments due by today?  N/A   Psychosocial issues?  No   Did the patient receive a copy of their discharge instructions?  Yes   Did the patient receive a copy of COVID-19 specific instructions?  Yes   Nursing interventions  Reviewed instructions with patient   What is the patient's perception of their health status since discharge?  Improving   Does the patient have any of the following symptoms?  None   Nursing Interventions  Nurse provided patient education   Is the patient/caregiver able to teach back steps to recovery at home?  Rest and rebuild strength, gradually increase activity, Set small, achievable goals for return to baseline health   Is the patient/caregiver able to teach back the hierarchy of who to call/visit for symptoms/problems? PCP, Specialist, Home health nurse, Urgent Care, ED, 911  Yes   TCM call completed?  Yes   Wrap up additional comments  States he is doing well, no questions or concerns at this time.          Martha Hart RN    1/15/2021, 10:23  EST

## 2021-01-15 NOTE — OUTREACH NOTE
Prep Survey      Responses   [facility] patient discharged from?  [facility]   Is LACE score < 7 ?  No   Emergency Room discharge w/ pulse ox?  No   Eligibility  [facility]   Date of Admission  01/11/21   Date of Discharge  01/14/21   Discharge Disposition  Home or Self Care   Discharge diagnosis  Covid 19 pneumonia   Does the patient have one of the following disease processes/diagnoses(primary or secondary)?  COVID-19   Does the patient have Home health ordered?  No   Is there a DME ordered?  No   Prep survey completed?  Yes          Kari Merida RN

## 2021-01-16 ENCOUNTER — READMISSION MANAGEMENT (OUTPATIENT)
Dept: CALL CENTER | Facility: HOSPITAL | Age: 46
End: 2021-01-16

## 2021-01-16 LAB
BACTERIA SPEC AEROBE CULT: NORMAL
BACTERIA SPEC AEROBE CULT: NORMAL

## 2021-01-16 NOTE — OUTREACH NOTE
COVID-19 Week 1 Survey      Responses   [facility] patient discharged from?  [facility]   Does the patient have one of the following disease processes/diagnoses(primary or secondary)?  COVID-19   COVID-19 underlying condition?  None   Call Number  Call 2   Week 1 Call successful?  Yes   Call start time  1257   Call end time  1300   Discharge diagnosis  Covid 19 pneumonia   Meds reviewed with patient/caregiver?  Yes   Is the patient having any side effects they believe may be caused by any medication additions or changes?  No   Does the patient have all medications ordered at discharge?  Yes   Is the patient taking all medications as directed (includes completed medication regime)?  Yes   Psychosocial issues?  No   What is the patient's perception of their health status since discharge?  Improving   Does the patient have any of the following symptoms?  None   Nursing Interventions  Nurse provided patient education   Pulse Ox monitoring  Intermittent   Pulse Ox device source  Hospital   O2 Sat comments  92% RA   O2 Sat: education provided  Sat levels   Is the patient/caregiver able to teach back steps to recovery at home?  Set small, achievable goals for return to baseline health, Rest and rebuild strength, gradually increase activity, Eat a well-balance diet   If the patient is a current smoker, are they able to teach back resources for cessation?  Not a smoker   Is the patient/caregiver able to teach back the hierarchy of who to call/visit for symptoms/problems? PCP, Specialist, Home health nurse, Urgent Care, ED, 911  Yes   COVID-19 call completed?  Yes          Maggie High RN

## 2021-01-17 ENCOUNTER — READMISSION MANAGEMENT (OUTPATIENT)
Dept: CALL CENTER | Facility: HOSPITAL | Age: 46
End: 2021-01-17

## 2021-01-17 NOTE — OUTREACH NOTE
COVID-19 Week 1 Survey      Responses   [facility] patient discharged from?  [facility]   Does the patient have one of the following disease processes/diagnoses(primary or secondary)?  COVID-19   COVID-19 underlying condition?  None   Call Number  Call 3   Week 1 Call successful?  Yes   Call start time  1212   Call end time  1214   Discharge diagnosis  Covid 19 pneumonia   Is the patient having any side effects they believe may be caused by any medication additions or changes?  No   Does the patient have all medications ordered at discharge?  Yes   Is the patient taking all medications as directed (includes completed medication regime)?  Yes   Medication comments  still on steroid   Does the patient have a primary care provider?   Yes   Did the patient receive a copy of their discharge instructions?  Yes   Did the patient receive a copy of COVID-19 specific instructions?  Yes   Nursing interventions  Reviewed instructions with patient   What is the patient's perception of their health status since discharge?  Improving   Does the patient have any of the following symptoms?  None   Nursing Interventions  Nurse provided patient education   Pulse Ox monitoring  Intermittent   O2 Sat comments  96% on RA   O2 Sat: education provided  Sat levels, When to seek care   O2 Sat education comments  Told pt. if unable to maintain O2 level 90% or greater to return to ED   Is the patient/caregiver able to teach back steps to recovery at home?  Rest and rebuild strength, gradually increase activity, Eat a well-balance diet   Is the patient/caregiver able to teach back the hierarchy of who to call/visit for symptoms/problems? PCP, Specialist, Home health nurse, Urgent Care, ED, 911  Yes   COVID-19 call completed?  Yes   Wrap up additional comments  Quick call pt. states he is doing fine. No concerns voiced at this time.          Alla Wells RN

## 2021-01-20 ENCOUNTER — READMISSION MANAGEMENT (OUTPATIENT)
Dept: CALL CENTER | Facility: HOSPITAL | Age: 46
End: 2021-01-20

## 2021-01-20 NOTE — OUTREACH NOTE
COVID-19 Week 2 Survey      Responses   [facility] patient discharged from?  [facility]   Does the patient have one of the following disease processes/diagnoses(primary or secondary)?  COVID-19   COVID-19 underlying condition?  None   Call Number  Call 1   COVID-19 Week 2: Call 1 attempt successful?  Yes   Call start time  0942   Call end time  0945   Meds reviewed with patient/caregiver?  Yes   Is the patient having any side effects they believe may be caused by any medication additions or changes?  No   Does the patient have all medications ordered at discharge?  Yes   Is the patient taking all medications as directed (includes completed medication regime)?  Yes   Does the patient have a primary care provider?   Yes   Has the patient kept scheduled appointments due by today?  N/A   Has home health visited the patient within 72 hours of discharge?  N/A   Psychosocial issues?  No   Did the patient receive a copy of their discharge instructions?  Yes   Did the patient receive a copy of COVID-19 specific instructions?  Yes   Nursing interventions  Reviewed instructions with patient   What is the patient's perception of their health status since discharge?  Improving   Does the patient have any of the following symptoms?  None   Nursing Interventions  Nurse provided patient education   Pulse Ox monitoring  Intermittent   Pulse Ox device source  Hospital   O2 Sat comments  99% on RA   Is the patient/caregiver able to teach back the hierarchy of who to call/visit for symptoms/problems? PCP, Specialist, Home health nurse, Urgent Care, ED, 911  Yes   COVID-19 call completed?  Yes   Wrap up additional comments  States continues to improve. Denies any s/s other than some fatigue. States will keep video visit appt tomorrow. Denies any needs today.          Kathi Candelaria RN

## 2021-01-21 ENCOUNTER — TELEMEDICINE (OUTPATIENT)
Dept: INTERNAL MEDICINE | Facility: CLINIC | Age: 46
End: 2021-01-21

## 2021-01-21 DIAGNOSIS — U07.1 COVID-19: ICD-10-CM

## 2021-01-21 DIAGNOSIS — R74.8 ELEVATED LIVER ENZYMES: Primary | ICD-10-CM

## 2021-01-21 DIAGNOSIS — J18.9 PNEUMONIA OF BOTH LUNGS DUE TO INFECTIOUS ORGANISM, UNSPECIFIED PART OF LUNG: ICD-10-CM

## 2021-01-21 PROCEDURE — 99213 OFFICE O/P EST LOW 20 MIN: CPT | Performed by: FAMILY MEDICINE

## 2021-01-21 NOTE — PROGRESS NOTES
Kraig Stafford is a 45 y.o. male.    Chief Complaint   Patient presents with   • COVID infection     You have chosen to receive care through a telehealth visit.  Do you consent to use a video/audio connection for your medical care today? Yes    Medications, allergies, and medical history have all been reviewed with the patient today.    HPI   Patient presents via video visit to establish care today.  He was recently hospitalized at [facility] with Covid related pneumonia.  He was hospitalized for 3 days.  He was diagnosed with Covid 6 days prior to admission.  He reported worsening shortness of breath and had been checking pulse ox at home reading into the mid 70s.  Upon arrival to emergency room oxygen saturation was 87% on room air.  After 3 L of oxygen was placed, oxygen saturation improved to 92%.  Chest x-ray did reveal worsening bilateral infiltrates.  Patient was felt to have a superimposed bacterial pneumonia.  He was treated with IV antibiotics including azithromycin and Rocephin.  He also received Decadron and remdesivir.  He did improve over his 3-day stay in the hospital and was discharged home in stable condition.  Currently he is doing well.  He has been monitoring oxygen saturation at home and O2 has been running 96 to 98% on room air.  He denies any cough or shortness of breath. Denies any loss of taste.  While hospitalized, it was noted that his transaminases were elevated.  He has no previous history of elevated liver enzymes.  He rarely drinks alcohol.  He has no known family history of liver problems.      The following portions of the patient's history were reviewed and updated as appropriate: allergies, current medications, past family history, past medical history, past social history, past surgical history and problem list.     No past medical history on file.    Past Surgical History:   Procedure Laterality Date   • KNEE SURGERY      patellar tendon       Family History    Problem Relation Age of Onset   • Hypertension Mother    • Hypertension Father        Social History     Socioeconomic History   • Marital status:      Spouse name: Not on file   • Number of children: Not on file   • Years of education: Not on file   • Highest education level: Not on file   Tobacco Use   • Smoking status: Never Smoker   Substance and Sexual Activity   • Alcohol use: Yes     Comment: occassionally   • Drug use: Never   • Sexual activity: Yes     Partners: Female       No Known Allergies      Current Outpatient Medications:   •  albuterol sulfate  (90 Base) MCG/ACT inhaler, Inhale 2 puffs Every 6 (Six) Hours As Needed for Wheezing or Shortness of Air., Disp: 8.5 g, Rfl: 0  •  benzonatate (TESSALON) 100 MG capsule, Take 1 capsule by mouth 3 (Three) Times a Day As Needed for Cough., Disp: 30 capsule, Rfl: 0    ROS    Review of Systems   Constitutional: Negative for chills, fatigue and fever.   HENT: Negative for congestion, postnasal drip and sore throat.    Respiratory: Negative for cough, shortness of breath and wheezing.    Cardiovascular: Negative for chest pain and leg swelling.   Gastrointestinal: Negative for abdominal pain, constipation, diarrhea, nausea and vomiting.   Endocrine: Negative for cold intolerance and heat intolerance.   Genitourinary: Negative for dysuria and frequency.   Musculoskeletal: Negative for arthralgias and back pain.   Skin: Negative for color change and rash.   Allergic/Immunologic: Negative for environmental allergies.   Neurological: Negative for weakness, numbness and headache.   Hematological: Does not bruise/bleed easily.   Psychiatric/Behavioral: Negative for depressed mood. The patient is not nervous/anxious.        There were no vitals filed for this visit.  There is no height or weight on file to calculate BMI.    Physical Exam     Physical Exam  Constitutional:       General: He is not in acute distress.     Appearance: Normal appearance. He is  well-developed.   HENT:      Head: Normocephalic and atraumatic.      Right Ear: External ear normal.      Left Ear: External ear normal.      Nose: Nose normal.   Eyes:      Extraocular Movements: Extraocular movements intact.      Conjunctiva/sclera: Conjunctivae normal.   Pulmonary:      Effort: Pulmonary effort is normal. No respiratory distress.   Skin:     Coloration: Skin is not pale.      Findings: No erythema.   Neurological:      Mental Status: He is alert and oriented to person, place, and time.      Cranial Nerves: No cranial nerve deficit.   Psychiatric:         Mood and Affect: Mood normal.         Behavior: Behavior normal.         Thought Content: Thought content normal.         Assessment/Plan    Problems Addressed this Visit     None      Visit Diagnoses     Elevated liver enzymes    -  Primary    Relevant Orders    Comprehensive Metabolic Panel    Gamma GT    Mitochondrial Antibodies, M2    Ceruloplasmin    ZOË With / DsDNA, RNP, Sjogrens A / B, Smith    Hepatitis panel, acute    5' Nucleotidase    Pneumonia of both lungs due to infectious organism, unspecified part of lung        COVID-19            Patient has completely recovered from COVID-19 and superimposed bacterial pneumonia.  He has completed antibiotics and steroids.  He has not required his albuterol inhaler and is no longer taking Tessalon Perles.  Liver enzymes, as mentioned above, were elevated during hospitalization.  Will obtain further laboratory studies to evaluate.    No orders of the defined types were placed in this encounter.      No orders of the defined types were placed in this encounter.      No follow-ups on file.      Lucita King DO

## 2021-03-18 ENCOUNTER — OFFICE VISIT (OUTPATIENT)
Dept: INTERNAL MEDICINE | Facility: CLINIC | Age: 46
End: 2021-03-18

## 2021-03-18 VITALS
HEART RATE: 75 BPM | TEMPERATURE: 98.9 F | BODY MASS INDEX: 35.88 KG/M2 | SYSTOLIC BLOOD PRESSURE: 136 MMHG | DIASTOLIC BLOOD PRESSURE: 80 MMHG | WEIGHT: 288.6 LBS | OXYGEN SATURATION: 99 % | HEIGHT: 75 IN

## 2021-03-18 DIAGNOSIS — R79.89 ELEVATED LFTS: ICD-10-CM

## 2021-03-18 DIAGNOSIS — I10 ESSENTIAL HYPERTENSION: Primary | ICD-10-CM

## 2021-03-18 PROCEDURE — 99213 OFFICE O/P EST LOW 20 MIN: CPT | Performed by: FAMILY MEDICINE

## 2021-03-18 RX ORDER — LISINOPRIL 10 MG/1
10 TABLET ORAL DAILY
Qty: 90 TABLET | Refills: 3 | Status: SHIPPED | OUTPATIENT
Start: 2021-03-18 | End: 2021-05-20 | Stop reason: DRUGHIGH

## 2021-03-18 NOTE — PROGRESS NOTES
Kraig Stafford is a 45 y.o. male.    Chief Complaint   Patient presents with   • Hypertension   • Diabetes       HPI   Patient has hypertension.  They are taking no medications currently.  He did take 2-3 pills that he had left over from 2 years ago. He was seen at the eye doctor recently with a BP of 150/90 and advised to come to his PCP.  They recommended workup for HTN and diabetes.  The patient denies any side effects to the medication.  Blood pressure is controlled in the office today.  Blood pressure has been running unknown at home.  They are following a low salt diet.  They are active.  He runs and lifts weight.     Patient also has elevated LFTs.  Further labs were ordered a couple of months ago.  Advised glucose is included in one of the tests ordered.      The following portions of the patient's history were reviewed and updated as appropriate: allergies, current medications, past family history, past medical history, past social history, past surgical history and problem list.     No Known Allergies      Current Outpatient Medications:   •  lisinopril (PRINIVIL,ZESTRIL) 10 MG tablet, Take 1 tablet by mouth Daily., Disp: 90 tablet, Rfl: 3    ROS    Review of Systems   Constitutional: Negative for chills, fatigue and fever.   HENT: Negative for congestion, postnasal drip and sore throat.    Respiratory: Negative for cough, shortness of breath and wheezing.    Cardiovascular: Negative for chest pain and leg swelling.   Gastrointestinal: Negative for abdominal pain, constipation, diarrhea, nausea and vomiting.   Musculoskeletal: Negative for arthralgias and back pain.   Neurological: Negative for dizziness, weakness, light-headedness, numbness and headache.   Psychiatric/Behavioral: Negative for depressed mood. The patient is not nervous/anxious.        Vitals:    03/18/21 1546   BP: 136/80   BP Location: Left arm   Patient Position: Sitting   Cuff Size: Adult   Pulse: 75   Temp: 98.9 °F (37.2 °C)  "  TempSrc: Temporal   SpO2: 99%   Weight: 131 kg (288 lb 9.6 oz)   Height: 190.5 cm (75\")     Body mass index is 36.07 kg/m².    Physical Exam     Physical Exam  Constitutional:       General: He is not in acute distress.     Appearance: Normal appearance. He is well-developed.   HENT:      Head: Normocephalic and atraumatic.      Right Ear: Tympanic membrane and external ear normal.      Left Ear: Tympanic membrane and external ear normal.   Eyes:      Extraocular Movements: Extraocular movements intact.      Conjunctiva/sclera: Conjunctivae normal.      Pupils: Pupils are equal, round, and reactive to light.   Cardiovascular:      Rate and Rhythm: Normal rate and regular rhythm.      Heart sounds: No murmur heard.     Pulmonary:      Effort: Pulmonary effort is normal. No respiratory distress.      Breath sounds: Normal breath sounds. No wheezing.   Abdominal:      General: Bowel sounds are normal. There is no distension.      Palpations: Abdomen is soft.      Tenderness: There is no abdominal tenderness.   Musculoskeletal:      Cervical back: Normal range of motion and neck supple.      Right lower leg: No edema.      Left lower leg: No edema.   Lymphadenopathy:      Cervical: No cervical adenopathy.   Skin:     General: Skin is warm and dry.   Neurological:      Mental Status: He is alert and oriented to person, place, and time.      Cranial Nerves: No cranial nerve deficit.   Psychiatric:         Mood and Affect: Mood normal.         Behavior: Behavior normal.         Assessment/Plan    Problems Addressed this Visit        Cardiac and Vasculature    Essential hypertension - Primary     Controlled in office with 2 doses of lisinopril.  Will start back on lisinopril 10mg daily.  Discussed potential side effects of the medication.           Relevant Medications    lisinopril (PRINIVIL,ZESTRIL) 10 MG tablet       Gastrointestinal Abdominal     Elevated LFTs     Encouraged to have labs drawn today to evaluate " further.           Diagnoses       Codes Comments    Essential hypertension    -  Primary ICD-10-CM: I10  ICD-9-CM: 401.9     Elevated LFTs     ICD-10-CM: R79.89  ICD-9-CM: 790.6           New Medications Ordered This Visit   Medications   • lisinopril (PRINIVIL,ZESTRIL) 10 MG tablet     Sig: Take 1 tablet by mouth Daily.     Dispense:  90 tablet     Refill:  3       No orders of the defined types were placed in this encounter.      Return in about 1 month (around 4/18/2021) for HTN.    Lucita King DO

## 2021-03-19 PROBLEM — I10 ESSENTIAL HYPERTENSION: Status: ACTIVE | Noted: 2021-03-19

## 2021-03-19 PROBLEM — U07.1 PNEUMONIA DUE TO COVID-19 VIRUS: Status: RESOLVED | Noted: 2021-01-11 | Resolved: 2021-03-19

## 2021-03-19 PROBLEM — J12.82 PNEUMONIA DUE TO COVID-19 VIRUS: Status: RESOLVED | Noted: 2021-01-11 | Resolved: 2021-03-19

## 2021-03-19 PROBLEM — R79.89 ELEVATED LFTS: Status: ACTIVE | Noted: 2021-03-19

## 2021-03-19 NOTE — ASSESSMENT & PLAN NOTE
Controlled in office with 2 doses of lisinopril.  Will start back on lisinopril 10mg daily.  Discussed potential side effects of the medication.

## 2021-03-22 ENCOUNTER — TELEPHONE (OUTPATIENT)
Dept: INTERNAL MEDICINE | Facility: CLINIC | Age: 46
End: 2021-03-22

## 2021-03-22 LAB
5NT SERPL-CCNC: 3 IU/L (ref 0–10)
ALBUMIN SERPL-MCNC: 4.4 G/DL (ref 4–5)
ALBUMIN/GLOB SERPL: 1.6 {RATIO} (ref 1.2–2.2)
ALP SERPL-CCNC: 71 IU/L (ref 39–117)
ALT SERPL-CCNC: 32 IU/L (ref 0–44)
AST SERPL-CCNC: 33 IU/L (ref 0–40)
BILIRUB SERPL-MCNC: 0.4 MG/DL (ref 0–1.2)
BUN SERPL-MCNC: 18 MG/DL (ref 6–24)
BUN/CREAT SERPL: 14 (ref 9–20)
CALCIUM SERPL-MCNC: 9.1 MG/DL (ref 8.7–10.2)
CENTROMERE B AB SER-ACNC: <0.2 AI (ref 0–0.9)
CERULOPLASMIN SERPL-MCNC: 20.5 MG/DL (ref 16–31)
CHLORIDE SERPL-SCNC: 107 MMOL/L (ref 96–106)
CHROMATIN AB SERPL-ACNC: <0.2 AI (ref 0–0.9)
CO2 SERPL-SCNC: 22 MMOL/L (ref 20–29)
CREAT SERPL-MCNC: 1.28 MG/DL (ref 0.76–1.27)
DSDNA AB SER-ACNC: <1 IU/ML (ref 0–9)
ENA JO1 AB SER-ACNC: <0.2 AI (ref 0–0.9)
ENA RNP AB SER-ACNC: <0.2 AI (ref 0–0.9)
ENA SCL70 AB SER-ACNC: <0.2 AI (ref 0–0.9)
ENA SM AB SER-ACNC: <0.2 AI (ref 0–0.9)
ENA SS-A AB SER-ACNC: <0.2 AI (ref 0–0.9)
ENA SS-B AB SER-ACNC: <0.2 AI (ref 0–0.9)
GGT SERPL-CCNC: 36 IU/L (ref 0–65)
GLOBULIN SER CALC-MCNC: 2.8 G/DL (ref 1.5–4.5)
GLUCOSE SERPL-MCNC: 94 MG/DL (ref 65–99)
HAV IGM SERPL QL IA: NEGATIVE
HBV CORE IGM SERPL QL IA: NEGATIVE
HBV SURFACE AG SERPL QL IA: NEGATIVE
HCV AB S/CO SERPL IA: <0.1 S/CO RATIO (ref 0–0.9)
Lab: NORMAL
MITOCHONDRIA M2 IGG SER-ACNC: <20 UNITS (ref 0–20)
POTASSIUM SERPL-SCNC: 4.3 MMOL/L (ref 3.5–5.2)
PROT SERPL-MCNC: 7.2 G/DL (ref 6–8.5)
SODIUM SERPL-SCNC: 142 MMOL/L (ref 134–144)

## 2021-03-22 NOTE — TELEPHONE ENCOUNTER
Caller: Kraig Stafford    Relationship: Self    Best call back number:[phone]      What medications are you currently taking:   Current Outpatient Medications on File Prior to Visit   Medication Sig Dispense Refill   • lisinopril (PRINIVIL,ZESTRIL) 10 MG tablet Take 1 tablet by mouth Daily. 90 tablet 3     No current facility-administered medications on file prior to visit.        When did you start taking these medications: N/A    Which medication are you concerned about:    lisinopril (PRINIVIL,ZESTRIL) 10 MG tablet         Who prescribed you this medication: DR PERRY    What are your concerns: PATIENT IS WANTING TO KNOW IF HE SHOULD TAKE MORE MEDICATION BECAUSE HIS BOTTOM NUMBER ON BLOOD PRESSURE IS SLIGHTLY ELEVATED  How long have you been taking these medications: N/A    How long have you had these concerns: 03/23

## 2021-03-24 ENCOUNTER — TELEPHONE (OUTPATIENT)
Dept: INTERNAL MEDICINE | Facility: CLINIC | Age: 46
End: 2021-03-24

## 2021-03-24 RX ORDER — AMLODIPINE BESYLATE 10 MG/1
10 TABLET ORAL DAILY
Qty: 90 TABLET | Refills: 1 | Status: SHIPPED | OUTPATIENT
Start: 2021-03-24

## 2021-03-24 NOTE — TELEPHONE ENCOUNTER
Patient called back due to being unsure how to take the medications.   I spoke with the patient and advised him to continue taking lisinopril bid and add on the amlodipine qd. I advised him to let us know if this brought the blood pressure down too low or if it wasn't helping so we could make further adjustments.

## 2021-03-24 NOTE — TELEPHONE ENCOUNTER
PATIENT CALLED AND STATED THAT HIS BLOOD PRESSURE MEDICATION IS NOT HELPING EVEN THOUGH PATIENT IS TAKING TWO PILLS INSTEAD OF ONE PILL. PATIENT WOULD LIKE A CALL BACK REGARDING THIS INFORMATION. PATIENT'S BLOOD PRESSURE 03/24/2021 168/103 7AM    lisinopril (PRINIVIL,ZESTRIL) 10 MG tablet    [id number] [address] AT [facility] [address] - [phone]  - [phone]   [phone]    CALL BACK:[phone]

## 2021-04-05 RX ORDER — LISINOPRIL 10 MG/1
10 TABLET ORAL DAILY
Qty: 90 TABLET | Refills: 3 | OUTPATIENT
Start: 2021-04-05

## 2021-04-08 NOTE — TELEPHONE ENCOUNTER
Caller: Kraig Staffordlando    Relationship: Self    Best call back number:     [phone]     Medication needed:   Requested Prescriptions     Pending Prescriptions Disp Refills   • lisinopril (PRINIVIL,ZESTRIL) 10 MG tablet 90 tablet 3     Sig: Take 1 tablet by mouth Daily.     When do you need the refill by:     ASAP    What additional details did the patient provide when requesting the medication:     PATIENT IS COMPLETELY OUT OF THE MEDICATION    PATIENT STATED HE HAS BEEN CHECKING HIS BP READINGS, AND HIS BP HAS BEEN 175/102    Does the patient have less than a 3 day supply:  [x] Yes  [] No    What is the patient's preferred pharmacy:      [facility] [place], [place]    TELEPHONE CONTACT:    [phone]    FAX:    [phone]    DR PERRY, DO

## 2021-04-09 RX ORDER — LISINOPRIL 10 MG/1
10 TABLET ORAL DAILY
Qty: 90 TABLET | Refills: 3 | OUTPATIENT
Start: 2021-04-09

## 2021-04-09 NOTE — TELEPHONE ENCOUNTER
Patient has been notified that the Lisinopril had been sent in by Dr. King on 3/18/2021 with #90 and 3 refills

## 2021-05-20 ENCOUNTER — OFFICE VISIT (OUTPATIENT)
Dept: INTERNAL MEDICINE | Facility: CLINIC | Age: 46
End: 2021-05-20

## 2021-05-20 VITALS
SYSTOLIC BLOOD PRESSURE: 132 MMHG | OXYGEN SATURATION: 98 % | RESPIRATION RATE: 16 BRPM | DIASTOLIC BLOOD PRESSURE: 84 MMHG | HEIGHT: 75 IN | TEMPERATURE: 98 F | HEART RATE: 71 BPM | BODY MASS INDEX: 36.08 KG/M2 | WEIGHT: 290.2 LBS

## 2021-05-20 DIAGNOSIS — I10 ESSENTIAL HYPERTENSION: Primary | ICD-10-CM

## 2021-05-20 PROCEDURE — 99213 OFFICE O/P EST LOW 20 MIN: CPT | Performed by: FAMILY MEDICINE

## 2021-05-20 RX ORDER — LISINOPRIL AND HYDROCHLOROTHIAZIDE 20; 12.5 MG/1; MG/1
1 TABLET ORAL DAILY
Qty: 90 TABLET | Refills: 1 | Status: SHIPPED | OUTPATIENT
Start: 2021-05-20

## 2021-05-20 NOTE — PROGRESS NOTES
"Kraig Stafford is a 45 y.o. male.    Chief Complaint   Patient presents with   • Hypertension     1 month f/u       HPI   Patient has hypertension.  They are taking lisinopril (Prinivil) and amlodipine.  He is taking lisinopril twice a day. They have been compliant with medications.  The patient denies any side effects to the medication.  Blood pressure is controlled in the office today.  Blood pressure has been running elevated at home at times.  Usually diastolic reading is above 90.  They are following a low salt diet.  They are active.  Going to start back exercising.  He admits to headaches off and on.      The following portions of the patient's history were reviewed and updated as appropriate: allergies, current medications, past family history, past medical history, past social history, past surgical history and problem list.     No Known Allergies      Current Outpatient Medications:   •  amLODIPine (NORVASC) 10 MG tablet, Take 1 tablet by mouth Daily., Disp: 90 tablet, Rfl: 1  •  lisinopril-hydrochlorothiazide (PRINZIDE,ZESTORETIC) 20-12.5 MG per tablet, Take 1 tablet by mouth Daily., Disp: 90 tablet, Rfl: 1    ROS    Review of Systems   Constitutional: Positive for fatigue. Negative for chills and fever.   HENT: Negative for congestion and rhinorrhea.    Respiratory: Negative for cough and shortness of breath.    Cardiovascular: Negative for chest pain.   Gastrointestinal: Negative for abdominal pain, constipation, diarrhea, nausea and vomiting.   Neurological: Positive for headache.   Psychiatric/Behavioral: Negative for agitation. The patient is not nervous/anxious.        Vitals:    05/20/21 0812   BP: 132/84   BP Location: Right arm   Patient Position: Sitting   Cuff Size: Adult   Pulse: 71   Resp: 16   Temp: 98 °F (36.7 °C)   TempSrc: Temporal   SpO2: 98%   Weight: 132 kg (290 lb 3.2 oz)   Height: 190.5 cm (75\")     Body mass index is 36.27 kg/m².    Physical Exam     Physical " Exam  Constitutional:       General: He is not in acute distress.     Appearance: Normal appearance. He is well-developed.   HENT:      Head: Normocephalic and atraumatic.      Right Ear: External ear normal.      Left Ear: External ear normal.   Eyes:      Conjunctiva/sclera: Conjunctivae normal.   Cardiovascular:      Rate and Rhythm: Normal rate and regular rhythm.      Heart sounds: No murmur heard.     Pulmonary:      Effort: Pulmonary effort is normal. No respiratory distress.      Breath sounds: Normal breath sounds. No wheezing.   Abdominal:      General: Bowel sounds are normal. There is no distension.      Palpations: Abdomen is soft.      Tenderness: There is no abdominal tenderness.   Skin:     General: Skin is warm and dry.   Neurological:      Mental Status: He is alert and oriented to person, place, and time.      Cranial Nerves: No cranial nerve deficit.   Psychiatric:         Mood and Affect: Mood normal.         Behavior: Behavior normal.         Assessment/Plan    Problems Addressed this Visit        Cardiac and Vasculature    Essential hypertension - Primary     Controlled in office today.  However, patient reports elevated blood pressures at home with headaches.  Will stop lisinopril and start lisinopril-hydrochlorothiazide.  Patient will continue amlodipine.  He has been encouraged to continue to monitor blood pressure at home.  Advised he may want to bring in his blood pressure cuff to compare to manual readings.         Relevant Medications    lisinopril-hydrochlorothiazide (PRINZIDE,ZESTORETIC) 20-12.5 MG per tablet      Diagnoses       Codes Comments    Essential hypertension    -  Primary ICD-10-CM: I10  ICD-9-CM: 401.9           New Medications Ordered This Visit   Medications   • lisinopril-hydrochlorothiazide (PRINZIDE,ZESTORETIC) 20-12.5 MG per tablet     Sig: Take 1 tablet by mouth Daily.     Dispense:  90 tablet     Refill:  1       No orders of the defined types were placed in this  encounter.      Return in about 3 months (around 8/20/2021) for HTN.    Lucita King, DO

## 2021-05-21 NOTE — ASSESSMENT & PLAN NOTE
Controlled in office today.  However, patient reports elevated blood pressures at home with headaches.  Will stop lisinopril and start lisinopril-hydrochlorothiazide.  Patient will continue amlodipine.  He has been encouraged to continue to monitor blood pressure at home.  Advised he may want to bring in his blood pressure cuff to compare to manual readings.
